# Patient Record
Sex: FEMALE | Race: WHITE
[De-identification: names, ages, dates, MRNs, and addresses within clinical notes are randomized per-mention and may not be internally consistent; named-entity substitution may affect disease eponyms.]

---

## 2020-02-22 ENCOUNTER — HOSPITAL (OUTPATIENT)
Dept: OTHER | Age: 65
End: 2020-02-22

## 2020-02-22 LAB
ANALYZER ANC (IANC): ABNORMAL
ANION GAP SERPL CALC-SCNC: 15 MMOL/L (ref 10–20)
BASOPHILS # BLD: 0 K/MCL (ref 0–0.3)
BASOPHILS NFR BLD: 0 %
BUN SERPL-MCNC: 16 MG/DL (ref 6–20)
BUN/CREAT SERPL: 14 (ref 7–25)
CALCIUM SERPL-MCNC: 10.3 MG/DL (ref 8.4–10.2)
CHLORIDE SERPL-SCNC: 102 MMOL/L (ref 98–107)
CO2 SERPL-SCNC: 27 MMOL/L (ref 21–32)
CREAT SERPL-MCNC: 1.18 MG/DL (ref 0.51–0.95)
DIFFERENTIAL METHOD BLD: ABNORMAL
EOSINOPHIL # BLD: 0.1 K/MCL (ref 0.1–0.5)
EOSINOPHIL NFR BLD: 2 %
ERYTHROCYTE [DISTWIDTH] IN BLOOD: 14.2 % (ref 11–15)
GLUCOSE SERPL-MCNC: 129 MG/DL (ref 65–99)
HCT VFR BLD CALC: 48.5 % (ref 36–46.5)
HGB BLD-MCNC: 15.7 G/DL (ref 12–15.5)
IMM GRANULOCYTES # BLD AUTO: 0 K/MCL (ref 0–0.2)
IMM GRANULOCYTES NFR BLD: 1 %
LYMPHOCYTES # BLD: 2.1 K/MCL (ref 1–4)
LYMPHOCYTES NFR BLD: 30 %
MAGNESIUM SERPL-MCNC: 2.8 MG/DL (ref 1.7–2.4)
MCH RBC QN AUTO: 28.8 PG (ref 26–34)
MCHC RBC AUTO-ENTMCNC: 32.4 G/DL (ref 32–36.5)
MCV RBC AUTO: 89 FL (ref 78–100)
MONOCYTES # BLD: 0.5 K/MCL (ref 0.3–0.9)
MONOCYTES NFR BLD: 7 %
NEUTROPHILS # BLD: 4.3 K/MCL (ref 1.8–7.7)
NEUTROPHILS NFR BLD: 60 %
NEUTS SEG NFR BLD: ABNORMAL %
NRBC (NRBCRE): 0 /100 WBC
PLATELET # BLD: 194 K/MCL (ref 140–450)
POTASSIUM SERPL-SCNC: 3.8 MMOL/L (ref 3.4–5.1)
RBC # BLD: 5.45 MIL/MCL (ref 4–5.2)
SODIUM SERPL-SCNC: 140 MMOL/L (ref 135–145)
TROPONIN I SERPL HS-MCNC: 0.02 NG/ML
WBC # BLD: 7.1 K/MCL (ref 4.2–11)

## 2020-02-22 PROCEDURE — 99285 EMERGENCY DEPT VISIT HI MDM: CPT | Performed by: EMERGENCY MEDICINE

## 2020-02-23 ENCOUNTER — DIAGNOSTIC TRANS (OUTPATIENT)
Dept: OTHER | Age: 65
End: 2020-02-23

## 2020-02-23 LAB
ANALYZER ANC (IANC): ABNORMAL
ANION GAP SERPL CALC-SCNC: 10 MMOL/L (ref 10–20)
BASOPHILS # BLD: 0 K/MCL (ref 0–0.3)
BASOPHILS NFR BLD: 0 %
BUN SERPL-MCNC: 14 MG/DL (ref 6–20)
BUN/CREAT SERPL: 15 (ref 7–25)
CALCIUM SERPL-MCNC: 9 MG/DL (ref 8.4–10.2)
CHLORIDE SERPL-SCNC: 108 MMOL/L (ref 98–107)
CHOLEST SERPL-MCNC: 290 MG/DL
CHOLEST SERPL-MCNC: ABNORMAL MG/DL
CHOLEST/HDLC SERPL: 2.3 {RATIO}
CO2 SERPL-SCNC: 29 MMOL/L (ref 21–32)
CREAT SERPL-MCNC: 0.91 MG/DL (ref 0.51–0.95)
DIFFERENTIAL METHOD BLD: ABNORMAL
EOSINOPHIL # BLD: 0 K/MCL (ref 0.1–0.5)
EOSINOPHIL NFR BLD: 1 %
ERYTHROCYTE [DISTWIDTH] IN BLOOD: 14.1 % (ref 11–15)
GLUCOSE SERPL-MCNC: 88 MG/DL (ref 65–99)
HBA1C MFR BLD: 10.0           24 %
HBA1C MFR BLD: 5.1 % (ref 4.5–5.6)
HBA1C MFR BLD: 6.0            126 %
HBA1C MFR BLD: 6.5            14 %
HBA1C MFR BLD: 7.0            154 %
HBA1C MFR BLD: 7.5            169 %
HBA1C MFR BLD: 8.0            183 %
HBA1C MFR BLD: 8.5            197 %
HBA1C MFR BLD: 9.0            212 %
HBA1C MFR BLD: 9.5            226 %
HBA1C MFR BLD: NORMAL %
HCT VFR BLD CALC: 41.1 % (ref 36–46.5)
HDLC SERPL-MCNC: 127 MG/DL
HDLC SERPL-MCNC: ABNORMAL MG/DL
HGB BLD-MCNC: 13.2 G/DL (ref 12–15.5)
IMM GRANULOCYTES # BLD AUTO: 0 K/MCL (ref 0–0.2)
IMM GRANULOCYTES NFR BLD: 0 %
LDLC SERPL CALC-MCNC: 155 MG/DL
LDLC SERPL CALC-MCNC: ABNORMAL MG/DL
LYMPHOCYTES # BLD: 1.7 K/MCL (ref 1–4)
LYMPHOCYTES NFR BLD: 20 %
MCH RBC QN AUTO: 28.4 PG (ref 26–34)
MCHC RBC AUTO-ENTMCNC: 32.1 G/DL (ref 32–36.5)
MCV RBC AUTO: 88.6 FL (ref 78–100)
MONOCYTES # BLD: 0.4 K/MCL (ref 0.3–0.9)
MONOCYTES NFR BLD: 5 %
NEUTROPHILS # BLD: 6 K/MCL (ref 1.8–7.7)
NEUTROPHILS NFR BLD: 74 %
NEUTS SEG NFR BLD: ABNORMAL %
NONHDLC SERPL-MCNC: 163 MG/DL
NONHDLC SERPL-MCNC: ABNORMAL MG/DL
NRBC (NRBCRE): 0 /100 WBC
PLATELET # BLD: 165 K/MCL (ref 140–450)
POTASSIUM SERPL-SCNC: 4.5 MMOL/L (ref 3.4–5.1)
RBC # BLD: 4.64 MIL/MCL (ref 4–5.2)
SODIUM SERPL-SCNC: 142 MMOL/L (ref 135–145)
T3FREE SERPL-MCNC: 0.6 PG/ML (ref 2.2–4)
T4 FREE SERPL-MCNC: 0.2 NG/DL (ref 0.8–1.5)
T4 FREE SERPL-MCNC: ABNORMAL NG/DL
TRIGL SERPL-MCNC: 39 MG/DL
TRIGL SERPL-MCNC: ABNORMAL MG/DL
TROPONIN I SERPL HS-MCNC: 0.85 NG/ML
TROPONIN I SERPL HS-MCNC: 0.9 NG/ML
TROPONIN I SERPL HS-MCNC: 1.18 NG/ML
TROPONIN I SERPL HS-MCNC: ABNORMAL NG/L
TSH SERPL-ACNC: 432.13 MCUNITS/ML (ref 0.35–5)
TSH SERPL-ACNC: ABNORMAL M[IU]/L
WBC # BLD: 8.2 K/MCL (ref 4.2–11)

## 2020-02-23 PROCEDURE — 99220 INITIAL OBSERVATION CARE,LEVL III: CPT | Performed by: INTERNAL MEDICINE

## 2020-03-06 RX ORDER — LEVOTHYROXINE SODIUM 112 UG/1
112 TABLET ORAL DAILY
Qty: 30 TABLET | Refills: 0 | Status: SHIPPED | OUTPATIENT
Start: 2020-03-06

## 2020-03-11 ENCOUNTER — OFFICE VISIT (OUTPATIENT)
Dept: CARDIOLOGY | Age: 65
End: 2020-03-11

## 2020-03-11 VITALS
DIASTOLIC BLOOD PRESSURE: 124 MMHG | BODY MASS INDEX: 18.66 KG/M2 | RESPIRATION RATE: 12 BRPM | HEART RATE: 72 BPM | WEIGHT: 112 LBS | HEIGHT: 65 IN | SYSTOLIC BLOOD PRESSURE: 192 MMHG

## 2020-03-11 DIAGNOSIS — R00.2 PALPITATIONS: Primary | ICD-10-CM

## 2020-03-11 PROCEDURE — 93000 ELECTROCARDIOGRAM COMPLETE: CPT | Performed by: INTERNAL MEDICINE

## 2020-03-11 PROCEDURE — 99205 OFFICE O/P NEW HI 60 MIN: CPT | Performed by: INTERNAL MEDICINE

## 2020-03-11 RX ORDER — MULTIVIT-MIN/IRON/FOLIC ACID/K 18-600-40
2000 CAPSULE ORAL DAILY
COMMUNITY

## 2020-03-11 SDOH — HEALTH STABILITY: PHYSICAL HEALTH: ON AVERAGE, HOW MANY MINUTES DO YOU ENGAGE IN EXERCISE AT THIS LEVEL?: 60 MIN

## 2020-03-11 SDOH — HEALTH STABILITY: PHYSICAL HEALTH: ON AVERAGE, HOW MANY DAYS PER WEEK DO YOU ENGAGE IN MODERATE TO STRENUOUS EXERCISE (LIKE A BRISK WALK)?: 5 DAYS

## 2020-03-11 ASSESSMENT — PATIENT HEALTH QUESTIONNAIRE - PHQ9
1. LITTLE INTEREST OR PLEASURE IN DOING THINGS: NOT AT ALL
SUM OF ALL RESPONSES TO PHQ9 QUESTIONS 1 AND 2: 0
2. FEELING DOWN, DEPRESSED OR HOPELESS: NOT AT ALL
SUM OF ALL RESPONSES TO PHQ9 QUESTIONS 1 AND 2: 0

## 2020-03-17 ENCOUNTER — TELEPHONE (OUTPATIENT)
Dept: CARDIOLOGY | Age: 65
End: 2020-03-17

## 2020-03-26 ENCOUNTER — APPOINTMENT (OUTPATIENT)
Dept: CARDIOLOGY | Age: 65
End: 2020-03-26
Attending: INTERNAL MEDICINE

## 2022-05-18 ENCOUNTER — APPOINTMENT (OUTPATIENT)
Dept: CT IMAGING | Facility: HOSPITAL | Age: 67
End: 2022-05-18
Attending: EMERGENCY MEDICINE
Payer: COMMERCIAL

## 2022-05-18 ENCOUNTER — HOSPITAL ENCOUNTER (INPATIENT)
Facility: HOSPITAL | Age: 67
LOS: 7 days | Discharge: NURSING, REHAB OR LONG-TERM CARE | End: 2022-05-25
Attending: EMERGENCY MEDICINE | Admitting: HOSPITALIST
Payer: COMMERCIAL

## 2022-05-18 DIAGNOSIS — I16.1 HYPERTENSIVE EMERGENCY: ICD-10-CM

## 2022-05-18 DIAGNOSIS — I61.9 CEREBRAL BRAIN HEMORRHAGE (HCC): Primary | ICD-10-CM

## 2022-05-18 LAB
ALBUMIN SERPL-MCNC: 4.2 G/DL (ref 3.4–5)
ALBUMIN/GLOB SERPL: 1.2 {RATIO} (ref 1–2)
ALP LIVER SERPL-CCNC: 116 U/L
ALT SERPL-CCNC: 24 U/L
ANION GAP SERPL CALC-SCNC: 6 MMOL/L (ref 0–18)
APTT PPP: 24.6 SECONDS (ref 23.3–35.6)
AST SERPL-CCNC: 20 U/L (ref 15–37)
BASOPHILS # BLD AUTO: 0.01 X10(3) UL (ref 0–0.2)
BASOPHILS NFR BLD AUTO: 0.1 %
BILIRUB SERPL-MCNC: 0.3 MG/DL (ref 0.1–2)
BUN BLD-MCNC: 17 MG/DL (ref 7–18)
CALCIUM BLD-MCNC: 10.7 MG/DL (ref 8.5–10.1)
CHLORIDE SERPL-SCNC: 106 MMOL/L (ref 98–112)
CO2 SERPL-SCNC: 27 MMOL/L (ref 21–32)
CREAT BLD-MCNC: 0.77 MG/DL
EOSINOPHIL # BLD AUTO: 0.11 X10(3) UL (ref 0–0.7)
EOSINOPHIL NFR BLD AUTO: 1.4 %
ERYTHROCYTE [DISTWIDTH] IN BLOOD BY AUTOMATED COUNT: 12.9 %
EST. AVERAGE GLUCOSE BLD GHB EST-MCNC: 100 MG/DL (ref 68–126)
GLOBULIN PLAS-MCNC: 3.6 G/DL (ref 2.8–4.4)
GLUCOSE BLD-MCNC: 100 MG/DL (ref 70–99)
GLUCOSE BLD-MCNC: 89 MG/DL (ref 70–99)
HBA1C MFR BLD: 5.1 % (ref ?–5.7)
HCT VFR BLD AUTO: 45.2 %
HGB BLD-MCNC: 14.2 G/DL
IMM GRANULOCYTES # BLD AUTO: 0.02 X10(3) UL (ref 0–1)
IMM GRANULOCYTES NFR BLD: 0.2 %
INR BLD: 0.92 (ref 0.8–1.2)
LYMPHOCYTES # BLD AUTO: 2.57 X10(3) UL (ref 1–4)
LYMPHOCYTES NFR BLD AUTO: 31.7 %
MAGNESIUM SERPL-MCNC: 2.5 MG/DL (ref 1.6–2.6)
MCH RBC QN AUTO: 27.8 PG (ref 26–34)
MCHC RBC AUTO-ENTMCNC: 31.4 G/DL (ref 31–37)
MCV RBC AUTO: 88.6 FL
MONOCYTES # BLD AUTO: 0.78 X10(3) UL (ref 0.1–1)
MONOCYTES NFR BLD AUTO: 9.6 %
NEUTROPHILS # BLD AUTO: 4.61 X10 (3) UL (ref 1.5–7.7)
NEUTROPHILS # BLD AUTO: 4.61 X10(3) UL (ref 1.5–7.7)
NEUTROPHILS NFR BLD AUTO: 57 %
OSMOLALITY SERPL CALC.SUM OF ELEC: 290 MOSM/KG (ref 275–295)
PLATELET # BLD AUTO: 197 10(3)UL (ref 150–450)
POTASSIUM SERPL-SCNC: 4 MMOL/L (ref 3.5–5.1)
PROT SERPL-MCNC: 7.8 G/DL (ref 6.4–8.2)
PROTHROMBIN TIME: 12.3 SECONDS (ref 11.6–14.8)
RBC # BLD AUTO: 5.1 X10(6)UL
SARS-COV-2 RNA RESP QL NAA+PROBE: NOT DETECTED
SODIUM SERPL-SCNC: 139 MMOL/L (ref 136–145)
T4 FREE SERPL-MCNC: 1.1 NG/DL (ref 0.8–1.7)
TSI SER-ACNC: 13.5 MIU/ML (ref 0.36–3.74)
WBC # BLD AUTO: 8.1 X10(3) UL (ref 4–11)

## 2022-05-18 PROCEDURE — 70496 CT ANGIOGRAPHY HEAD: CPT | Performed by: EMERGENCY MEDICINE

## 2022-05-18 PROCEDURE — 70450 CT HEAD/BRAIN W/O DYE: CPT | Performed by: EMERGENCY MEDICINE

## 2022-05-18 PROCEDURE — 70498 CT ANGIOGRAPHY NECK: CPT | Performed by: EMERGENCY MEDICINE

## 2022-05-18 RX ORDER — HYDRALAZINE HYDROCHLORIDE 20 MG/ML
10 INJECTION INTRAMUSCULAR; INTRAVENOUS EVERY 2 HOUR PRN
Status: DISCONTINUED | OUTPATIENT
Start: 2022-05-18 | End: 2022-05-25

## 2022-05-18 RX ORDER — LORAZEPAM 2 MG/ML
1 INJECTION INTRAMUSCULAR
Status: DISCONTINUED | OUTPATIENT
Start: 2022-05-18 | End: 2022-05-25

## 2022-05-18 RX ORDER — ATORVASTATIN CALCIUM 40 MG/1
40 TABLET, FILM COATED ORAL NIGHTLY
Status: DISCONTINUED | OUTPATIENT
Start: 2022-05-18 | End: 2022-05-18

## 2022-05-18 RX ORDER — METOCLOPRAMIDE HYDROCHLORIDE 5 MG/ML
10 INJECTION INTRAMUSCULAR; INTRAVENOUS EVERY 8 HOURS PRN
Status: DISCONTINUED | OUTPATIENT
Start: 2022-05-18 | End: 2022-05-25

## 2022-05-18 RX ORDER — POLYETHYLENE GLYCOL 3350 17 G/17G
17 POWDER, FOR SOLUTION ORAL DAILY PRN
Status: DISCONTINUED | OUTPATIENT
Start: 2022-05-18 | End: 2022-05-25

## 2022-05-18 RX ORDER — SENNOSIDES 8.6 MG
17.2 TABLET ORAL NIGHTLY PRN
Status: DISCONTINUED | OUTPATIENT
Start: 2022-05-18 | End: 2022-05-25

## 2022-05-18 RX ORDER — ACETAMINOPHEN 650 MG/1
650 SUPPOSITORY RECTAL EVERY 4 HOURS PRN
Status: DISCONTINUED | OUTPATIENT
Start: 2022-05-18 | End: 2022-05-25

## 2022-05-18 RX ORDER — SODIUM PHOSPHATE, DIBASIC AND SODIUM PHOSPHATE, MONOBASIC 7; 19 G/133ML; G/133ML
1 ENEMA RECTAL ONCE AS NEEDED
Status: DISCONTINUED | OUTPATIENT
Start: 2022-05-18 | End: 2022-05-25

## 2022-05-18 RX ORDER — SODIUM CHLORIDE 9 MG/ML
INJECTION, SOLUTION INTRAVENOUS CONTINUOUS
Status: DISCONTINUED | OUTPATIENT
Start: 2022-05-18 | End: 2022-05-20

## 2022-05-18 RX ORDER — ONDANSETRON 2 MG/ML
4 INJECTION INTRAMUSCULAR; INTRAVENOUS EVERY 6 HOURS PRN
Status: DISCONTINUED | OUTPATIENT
Start: 2022-05-18 | End: 2022-05-25

## 2022-05-18 RX ORDER — LABETALOL HYDROCHLORIDE 5 MG/ML
10 INJECTION, SOLUTION INTRAVENOUS EVERY 10 MIN PRN
Status: COMPLETED | OUTPATIENT
Start: 2022-05-18 | End: 2022-05-23

## 2022-05-18 RX ORDER — LEVOTHYROXINE SODIUM 112 UG/1
112 TABLET ORAL DAILY
COMMUNITY
Start: 2022-05-08

## 2022-05-18 RX ORDER — LABETALOL HYDROCHLORIDE 5 MG/ML
20 INJECTION, SOLUTION INTRAVENOUS ONCE
Status: COMPLETED | OUTPATIENT
Start: 2022-05-18 | End: 2022-05-18

## 2022-05-18 RX ORDER — ACETAMINOPHEN 325 MG/1
650 TABLET ORAL EVERY 4 HOURS PRN
Status: DISCONTINUED | OUTPATIENT
Start: 2022-05-18 | End: 2022-05-25

## 2022-05-18 RX ORDER — BISACODYL 10 MG
10 SUPPOSITORY, RECTAL RECTAL
Status: DISCONTINUED | OUTPATIENT
Start: 2022-05-18 | End: 2022-05-25

## 2022-05-19 ENCOUNTER — APPOINTMENT (OUTPATIENT)
Dept: CT IMAGING | Facility: HOSPITAL | Age: 67
End: 2022-05-19
Attending: EMERGENCY MEDICINE
Payer: COMMERCIAL

## 2022-05-19 ENCOUNTER — APPOINTMENT (OUTPATIENT)
Dept: CV DIAGNOSTICS | Facility: HOSPITAL | Age: 67
End: 2022-05-19
Attending: EMERGENCY MEDICINE
Payer: COMMERCIAL

## 2022-05-19 LAB
ANION GAP SERPL CALC-SCNC: 1 MMOL/L (ref 0–18)
BUN BLD-MCNC: 10 MG/DL (ref 7–18)
CALCIUM BLD-MCNC: 9.5 MG/DL (ref 8.5–10.1)
CHLORIDE SERPL-SCNC: 111 MMOL/L (ref 98–112)
CHOLEST SERPL-MCNC: 193 MG/DL (ref ?–200)
CO2 SERPL-SCNC: 31 MMOL/L (ref 21–32)
CREAT BLD-MCNC: 0.58 MG/DL
ERYTHROCYTE [DISTWIDTH] IN BLOOD BY AUTOMATED COUNT: 12.8 %
GLUCOSE BLD-MCNC: 103 MG/DL (ref 70–99)
GLUCOSE BLD-MCNC: 90 MG/DL (ref 70–99)
GLUCOSE BLD-MCNC: 93 MG/DL (ref 70–99)
GLUCOSE BLD-MCNC: 94 MG/DL (ref 70–99)
HCT VFR BLD AUTO: 42 %
HDLC SERPL-MCNC: 97 MG/DL (ref 40–59)
HGB BLD-MCNC: 13.6 G/DL
LDLC SERPL CALC-MCNC: 85 MG/DL (ref ?–100)
MAGNESIUM SERPL-MCNC: 2.1 MG/DL (ref 1.6–2.6)
MCH RBC QN AUTO: 28.9 PG (ref 26–34)
MCHC RBC AUTO-ENTMCNC: 32.4 G/DL (ref 31–37)
MCV RBC AUTO: 89.2 FL
NONHDLC SERPL-MCNC: 96 MG/DL (ref ?–130)
OSMOLALITY SERPL CALC.SUM OF ELEC: 295 MOSM/KG (ref 275–295)
PLATELET # BLD AUTO: 170 10(3)UL (ref 150–450)
POTASSIUM SERPL-SCNC: 3.6 MMOL/L (ref 3.5–5.1)
RBC # BLD AUTO: 4.71 X10(6)UL
SODIUM SERPL-SCNC: 143 MMOL/L (ref 136–145)
TRIGL SERPL-MCNC: 59 MG/DL (ref 30–149)
VLDLC SERPL CALC-MCNC: 9 MG/DL (ref 0–30)
WBC # BLD AUTO: 8.1 X10(3) UL (ref 4–11)

## 2022-05-19 PROCEDURE — 99291 CRITICAL CARE FIRST HOUR: CPT | Performed by: INTERNAL MEDICINE

## 2022-05-19 PROCEDURE — 93306 TTE W/DOPPLER COMPLETE: CPT | Performed by: NURSE PRACTITIONER

## 2022-05-19 PROCEDURE — 70450 CT HEAD/BRAIN W/O DYE: CPT | Performed by: NURSE PRACTITIONER

## 2022-05-19 PROCEDURE — 99291 CRITICAL CARE FIRST HOUR: CPT | Performed by: NURSE PRACTITIONER

## 2022-05-19 RX ORDER — LISINOPRIL 10 MG/1
10 TABLET ORAL DAILY
Status: DISCONTINUED | OUTPATIENT
Start: 2022-05-19 | End: 2022-05-19

## 2022-05-19 RX ORDER — LOSARTAN POTASSIUM 25 MG/1
25 TABLET ORAL DAILY
Status: DISCONTINUED | OUTPATIENT
Start: 2022-05-19 | End: 2022-05-20

## 2022-05-19 RX ORDER — MULTIPLE VITAMINS W/ MINERALS TAB 9MG-400MCG
1 TAB ORAL DAILY
Status: DISCONTINUED | OUTPATIENT
Start: 2022-05-20 | End: 2022-05-25

## 2022-05-19 RX ORDER — LEVOTHYROXINE SODIUM 112 UG/1
112 TABLET ORAL DAILY
Status: DISCONTINUED | OUTPATIENT
Start: 2022-05-19 | End: 2022-05-25

## 2022-05-19 RX ORDER — MUSCLE RUB CREAM 100; 150 MG/G; MG/G
CREAM TOPICAL 3 TIMES DAILY PRN
Status: DISCONTINUED | OUTPATIENT
Start: 2022-05-19 | End: 2022-05-25

## 2022-05-19 RX ORDER — TRAMADOL HYDROCHLORIDE 50 MG/1
50 TABLET ORAL EVERY 6 HOURS PRN
Status: DISCONTINUED | OUTPATIENT
Start: 2022-05-19 | End: 2022-05-25

## 2022-05-19 RX ORDER — ACETAMINOPHEN 10 MG/ML
1000 INJECTION, SOLUTION INTRAVENOUS ONCE
Status: COMPLETED | OUTPATIENT
Start: 2022-05-19 | End: 2022-05-19

## 2022-05-19 NOTE — PHYSICAL THERAPY NOTE
PHYSICAL THERAPY EVALUATION - INPATIENT     Room Number: 5702/3040-N  Evaluation Date: 5/19/2022  Type of Evaluation: Initial  Physician Order: PT Eval and Treat    Presenting Problem: R thalamic hematoma  Co-Morbidities : anxiety, BRCA, HTN, hypothyroid  Reason for Therapy: Mobility Dysfunction and Discharge Planning    History related to current admission: Patient is a 74year old female admitted on 5/18/2022 from home for fall x 2, HA, L face numbness, LLE weakness. Pt diagnosed with R thalamic hematoma. ASSESSMENT   In this PT evaluation, the patient presents with the following impairments motor planning deficits, sitting/standing balance deficits, LUE/LLE weakness, decreased proprioception. These impairments and comorbidities manifest themselves as functional limitations in independent bed mobility, transfers, and gait. Pt able to progress to sitting/standing balance and >household distance ambulation with assist.  Pt requiring vcs for use of LUE/LLE. The patient is below baseline and would benefit from skilled inpatient PT to address the above deficits to assist patient in returning to prior to level of function. Functional outcome measures completed include [facility]. The -PAC '6-Clicks' Inpatient Basic Mobility Short Form was completed and this patient is demonstrating a Approx Degree of Impairment: 46.58%  degree of impairment in mobility. Research supports that patients with this level of impairment may benefit from acute rehab to achieve mod I levels, facilitators include age, family support and functional strength, barriers include sensory deficits. Based on this evaluation, patient's clinical presentation is evolving and overall the evaluation complexity is considered moderate. DISCHARGE RECOMMENDATIONS  PT Discharge Recommendations: Acute rehabilitation    PLAN  PT Treatment Plan: Bed mobility; Patient education; Family education;Gait training;Neuromuscular re-educate;Stair training;Transfer training;Balance training  Rehab Potential : Good  Frequency (Obs): 3-5x/week  Number of Visits to Meet Established Goals: 5      CURRENT GOALS    Goal #1 Patient is able to demonstrate supine - sit EOB @ level: modified independent     Goal #2 Patient is able to demonstrate transfers EOB to/from Chair/Wheelchair at assistance level: supervision     Goal #3 Patient is able to ambulate 200 feet with assist device: cane - straight at assistance level: supervision     Goal #4 Pt to sit indeply x 5 mins   Goal #5    Goal #6    Goal Comments: Goals established on 2022    HOME SITUATION  Type of Home: House   Home Layout:  (basement stairs to shower and laundry)                Lives With: Spouse (32 yo daughter and her boyfriend)  Drives: Yes  Patient Owned Equipment: None  Patient Regularly Uses: Glasses    Prior Level of [place]: works as a banker, prior to these symptoms, pt fully indep in [place] feeling cold causing her to jitter and sway, c/o numbness at L finger tips and difficulty eating      OBJECTIVE  Precautions:  (-160)  Fall Risk: High fall risk    WEIGHT BEARING RESTRICTION  Weight Bearing Restriction: None                PAIN ASSESSMENT  Ratin  Location: HA  Management Techniques:  Activity promotion    COGNITION  A&Ox4, follows commands, aware of deficits    RANGE OF MOTION AND STRENGTH ASSESSMENT  Upper extremity ROM and strength are within functional limits : decreased fluidity of LUE, increase use of RUE, *L handed    Lower extremity ROM is within functional limits     Lower extremity strength is within functional limits 4/5 LLE      BALANCE  Static Sitting: Fair  Dynamic Sitting: Fair -  Static Standing: Fair -  Dynamic Standing: Poor +    ADDITIONAL TESTS                                    ACTIVITY TOLERANCE  Pulse: 84        BP: 153/80        Patient Position: Sitting    O2 WALK  Oxygen Therapy  SPO2% Ambulation on Room Air: 100    NEUROLOGICAL FINDINGS      Intact proprio RUE/RLE   Decreased proprioception LUE 3rd DIP, LLE great toe  SILT BUE/BLE    KOLBY B ankles intact    Vision: functionally: able to identify objects R/L sides  Decreased attention to LUE               AM-PAC '6-Clicks' INPATIENT SHORT FORM - BASIC MOBILITY  How much difficulty does the patient currently have. .. Patient Difficulty: Turning over in bed (including adjusting bedclothes, sheets and blankets)?: None   Patient Difficulty: Sitting down on and standing up from a chair with arms (e.g., wheelchair, bedside commode, etc.): A Little   Patient Difficulty: Moving from lying on back to sitting on the side of the bed?: A Little   How much help from another person does the patient currently need. ..    Help from Another: Moving to and from a bed to a chair (including a wheelchair)?: A Little   Help from Another: Need to walk in hospital room?: A Little   Help from Another: Climbing 3-5 steps with a railing?: A Lot       AM-PAC Score:  Raw Score: 18   Approx Degree of Impairment: 46.58%   Standardized Score (AM-PAC Scale): 43.63   CMS Modifier (G-Code): CK    FUNCTIONAL ABILITY STATUS  Gait Assessment   Functional Mobility/Gait Assessment  Gait Assistance: Minimum assistance  Distance (ft): 200  Assistive Device:  (L hand held assist)    Skilled Therapy Provided     Bed Mobility:  Supine to long sit: supervision  Long sit>sitting to EOB min A at L side  Sitting balance with increased anterior/posterior sway, forward head occasional min A, otherwise CGA/close supervision  Sit<>Stand min A  Standing balance with single LUE support CGA, dynamic activities with min A and HHA, mod A for trunk turns and without UE support  Amb 10ft with RW: decreased speed min A for grasp on LUE and steering L side  Amb 200ft with LUE HHA min A, increased speed, occasional scissoring of LLE, narrow JUDAH, decreased speed of LLE; able to correct with constant vcs    Education: exam results, role of PT/OT, dc recs, safety, POC, goals, pt/pt's  in agreement    Patient End of Session: In bed;Needs met;Call light within reach;RN aware of session/findings; All patient questions and concerns addressed; Family present

## 2022-05-19 NOTE — PLAN OF CARE
Received report from [place]. Patient arrived on floor alert and  oriented x 3. No signs of distress noted. Family members at bedside. Encourage patient to use call light for assistance. Bed low and locked. Side rails padded. Wctm and notify service of any acute changes. Problem: Patient/Family Goals  Goal: Patient/Family Long Term Goal  Description: Patient's Long Term Goal: discharge home    Interventions:  - PT/OT, speech eval, monitor neuro status, follow up CT scans as needed  - See additional Care Plan goals for specific interventions  Outcome: Progressing  Goal: Patient/Family Short Term Goal  Description: Patient's Short Term Goal: maintain SBP within desired parameters    Interventions:   - monitor vitals, medications as indicated  - See additional Care Plan goals for specific interventions  Outcome: Progressing     Problem: NEUROLOGICAL - ADULT  Goal: Achieves stable or improved neurological status  Description: INTERVENTIONS  - Assess for and report changes in neurological status  - Initiate measures to prevent increased intracranial pressure  - Maintain blood pressure and fluid volume within ordered parameters to optimize cerebral perfusion and minimize risk of hemorrhage  - Monitor temperature, glucose, and sodium.  Initiate appropriate interventions as ordered  Outcome: Progressing  Goal: Absence of seizures  Description: INTERVENTIONS  - Monitor for seizure activity  - Administer anti-seizure medications as ordered  - Monitor neurological status  Outcome: Progressing  Goal: Achieves maximal functionality and self care  Description: INTERVENTIONS  - Monitor swallowing and airway patency with patient fatigue and changes in neurological status  - Encourage and assist patient to increase activity and self care with guidance from PT/OT  - Encourage visually impaired, hearing impaired and aphasic patients to use assistive/communication devices  Outcome: Progressing     Problem: Impaired Functional Mobility  Goal: Achieve highest/safest level of mobility/gait  Description: Interventions:  - Assess patient's functional ability and stability  - Promote increasing activity/tolerance for mobility and gait  - Educate and engage patient/family in tolerated activity level and precautions    Outcome: Progressing     Problem: Impaired Activities of Daily Living  Goal: Achieve highest/safest level of independence in self care  Description: Interventions:  - Assess ability and encourage patient to participate in ADLs to maximize function  - Promote sitting position while performing ADLs such as feeding, grooming, and bathing  - Educate and encourage patient/family in tolerated functional activity level and precautions during self-care    Outcome: Progressing     Problem: Impaired Swallowing  Goal: Minimize aspiration risk  Description: Interventions:  - Patient should be alert and upright for all feedings (90 degrees preferred)  - Offer food and liquids at a slow rate  - No straws  - Encourage small bites of food and small sips of liquid  - Offer pills one at a time, crush or deliver with applesauce as needed  - Discontinue feeding and notify MD (or speech pathologist) if coughing or persistent throat clearing or wet/gurgly vocal quality is noted  Outcome: Progressing

## 2022-05-19 NOTE — CM/SW NOTE
SW notified of PT rec for AR, PMR eval pending. SW to meet with pt to discuss dc planning and recs.     [place] 106, LSW

## 2022-05-19 NOTE — PLAN OF CARE
Received pt. From ER at 2200. Hourly neuro exams as charted in epic. Left sided facial droop, arm drift, decreased sensation to arm, leg and cheek. Seizure precautions initiated. Pt. has frequent painful cramping in legs and states it happens almost nightly but is currently not taking medication for it. Contacted by Dr. Nancy Kelly that repeat CT done at 0200 is stable. Alok ROMANO notified of results. [facility] hospitalist paged regarding admission, voice mail left, no call back. 250cc 0.9 bolus given for SBP <120, BP parameters were then changed to 100-160. NPO until speech evaluation done.

## 2022-05-19 NOTE — ED INITIAL ASSESSMENT (HPI)
Approximately 30 minutes ago she had a really bad headache on the front of her head and then nose and lips numb. Pt went to get up an fell. NO LOC/OR HITTING HER HEAD.  ASHWINI [id number]

## 2022-05-19 NOTE — PROGRESS NOTES
Patient received A&Ox4 but exhibiting left sided facial droop, pronator drift, muscular weakness and sensory deficits. Therapy came to visit patient this AM; patient cleared to have regular diet with thin liquids but recommendations made by OT/PT for acute rehab following discharge d/t functional deficits noted when ambulating in hallway. Providers visited patient; CTU orders placed and blood pressure medications adjusted. Daily cozaar added and PRN Hydralazine given this AM d/t SBP elevated above parameters; stabalized prior to transfer to CTU. Report called to accepting RN. Family aware of transfer.

## 2022-05-19 NOTE — PLAN OF CARE
Code stroke:    Called to [facility] ED C2  paged at 1958 for Code Stroke. Arrived to pt cart side at 2003 in the hallway. Accompanied pt to CT scan and scanning began at 2006    74 yr old female from home with PMHx of HTN, reportedly not taking any meds due to normalized BP and reported cough R/T to med. Pt presented with c/o frontal headache, numbness of mouth and nose, and slow fall to the floor as described as her foot gave out. Upon arrival found patient with NIH of 2 for L facial droop and L arm drift. She denied headache, numbness, tingling or decreased sensation at that time. pt hypertensive in 200's    Last Known Normal at 1915    Patient  doeshave contraindications for TPA-  Imaging findings- bleed    2009: Discussed case with Dr. Rafal Rowe,     neurologist on call. Patient does not meet criteria for neuro intervention based on factors listed above. Repeat NIH= 4 at 2100  For L facial droop, L arm drift. L  side decreased sensation,  L leg ataxia    Medicated for HTN and Cardene gtt titrated  When BP cuff changed ( small) BP readings improved significantly    Discussed case with Dr. Mary Lou Martin  bedside RN, patient, and family. All agree with current POC. Discussed NPO status with pt and family until SLP dima    Accompanied patient to CNICU.

## 2022-05-20 LAB
ANION GAP SERPL CALC-SCNC: 6 MMOL/L (ref 0–18)
BILIRUB UR QL STRIP.AUTO: NEGATIVE
BUN BLD-MCNC: 13 MG/DL (ref 7–18)
CALCIUM BLD-MCNC: 10.7 MG/DL (ref 8.5–10.1)
CHLORIDE SERPL-SCNC: 109 MMOL/L (ref 98–112)
CO2 SERPL-SCNC: 24 MMOL/L (ref 21–32)
COLOR UR AUTO: YELLOW
CREAT BLD-MCNC: 0.9 MG/DL
GLUCOSE BLD-MCNC: 110 MG/DL (ref 70–99)
GLUCOSE BLD-MCNC: 116 MG/DL (ref 70–99)
GLUCOSE BLD-MCNC: 98 MG/DL (ref 70–99)
GLUCOSE BLD-MCNC: 98 MG/DL (ref 70–99)
GLUCOSE UR STRIP.AUTO-MCNC: NEGATIVE MG/DL
KETONES UR STRIP.AUTO-MCNC: NEGATIVE MG/DL
LEUKOCYTE ESTERASE UR QL STRIP.AUTO: NEGATIVE
MAGNESIUM SERPL-MCNC: 2.4 MG/DL (ref 1.6–2.6)
NITRITE UR QL STRIP.AUTO: NEGATIVE
OSMOLALITY SERPL CALC.SUM OF ELEC: 288 MOSM/KG (ref 275–295)
PH UR STRIP.AUTO: 6 [PH] (ref 5–8)
POTASSIUM SERPL-SCNC: 4 MMOL/L (ref 3.5–5.1)
POTASSIUM SERPL-SCNC: 4 MMOL/L (ref 3.5–5.1)
PROT UR STRIP.AUTO-MCNC: NEGATIVE MG/DL
RBC UR QL AUTO: NEGATIVE
SODIUM SERPL-SCNC: 139 MMOL/L (ref 136–145)
SP GR UR STRIP.AUTO: 1.02 (ref 1–1.03)
UROBILINOGEN UR STRIP.AUTO-MCNC: <2 MG/DL

## 2022-05-20 PROCEDURE — 99233 SBSQ HOSP IP/OBS HIGH 50: CPT | Performed by: OTHER

## 2022-05-20 RX ORDER — LOSARTAN POTASSIUM 25 MG/1
25 TABLET ORAL
Status: DISCONTINUED | OUTPATIENT
Start: 2022-05-20 | End: 2022-05-21

## 2022-05-20 NOTE — CM/SW NOTE
SW met with pt to discuss dc recs. Discussed AR rec and PMR pending. Pt surprised of need for inpt rehab. Pt worried about work and if [id number] will be allowed. SW encouraged pt to call work to see what documentation is needed in order to go on leave. Pt reports she has filed once to assist with care taking for her son, who has cancer and her 5year old grandson. States she had to continue working as they would have had a leave without pay. Pt aware SW available to assist. SW left AR choice list with pt, SW to follow up with pt on choice later.      [place] 106, LSW

## 2022-05-20 NOTE — PLAN OF CARE
Assumed care for patient at 0730  Patient observed resting in bed. No signs of distress noted. Alert and oriented x 4. Up with max assist x 2 to [facility]. Medication given per [facility]. Patient is receiving tramadol for pain. Encourage patient to call for assistance. Bed low and locked. Frequently rounding performed. Wctm and notify service of any acute changes. Problem: Patient/Family Goals  Goal: Patient/Family Long Term Goal  Description: Patient's Long Term Goal: discharge home    Interventions:  - PT/OT, speech eval, monitor neuro status, follow up CT scans as needed  - See additional Care Plan goals for specific interventions  Outcome: Progressing  Goal: Patient/Family Short Term Goal  Description: Patient's Short Term Goal: maintain SBP within desired parameters    Interventions:   - monitor vitals, medications as indicated  - See additional Care Plan goals for specific interventions  Outcome: Progressing     Problem: NEUROLOGICAL - ADULT  Goal: Achieves stable or improved neurological status  Description: INTERVENTIONS  - Assess for and report changes in neurological status  - Initiate measures to prevent increased intracranial pressure  - Maintain blood pressure and fluid volume within ordered parameters to optimize cerebral perfusion and minimize risk of hemorrhage  - Monitor temperature, glucose, and sodium.  Initiate appropriate interventions as ordered  Outcome: Progressing  Goal: Absence of seizures  Description: INTERVENTIONS  - Monitor for seizure activity  - Administer anti-seizure medications as ordered  - Monitor neurological status  Outcome: Progressing  Goal: Achieves maximal functionality and self care  Description: INTERVENTIONS  - Monitor swallowing and airway patency with patient fatigue and changes in neurological status  - Encourage and assist patient to increase activity and self care with guidance from PT/OT  - Encourage visually impaired, hearing impaired and aphasic patients to use assistive/communication devices  Outcome: Progressing     Problem: Impaired Functional Mobility  Goal: Achieve highest/safest level of mobility/gait  Description: Interventions:  - Assess patient's functional ability and stability  - Promote increasing activity/tolerance for mobility and gait  - Educate and engage patient/family in tolerated activity level and precautions  {Additional Mobility   Outcome: Progressing     Problem: Impaired Activities of Daily Living  Goal: Achieve highest/safest level of independence in self care  Description: Interventions:  - Assess ability and encourage patient to participate in ADLs to maximize function  - Promote sitting position while performing ADLs such as feeding, grooming, and bathing  - Educate and encourage patient/family in tolerated functional activity level and precautions during self-care    Outcome: Progressing     Problem: Impaired Swallowing  Goal: Minimize aspiration risk  Description: Interventions:  - Patient should be alert and upright for all feedings (90 degrees preferred)  - Offer food and liquids at a slow rate  - No straws  - Encourage small bites of food and small sips of liquid  - Offer pills one at a time, crush or deliver with applesauce as needed  - Discontinue feeding and notify MD (or speech pathologist) if coughing or persistent throat clearing or wet/gurgly vocal quality is noted  Outcome: Progressing

## 2022-05-20 NOTE — PLAN OF CARE
Received patient awake in bed in dark room, patient is having severe headache not relieved with Tylenol. Called MD tonight and got order for Tramadol and she had relief from that, no nausea/vomiting noted. On Stroke protocol, has left arm weakness, slightly. On room air, clear lungs. NSR on tele. To keep -160. Voids per [facility]. Accuchecks q 6 hrs per stroke protocol. Problem: Patient/Family Goals  Goal: Patient/Family Long Term Goal  Description: Patient's Long Term Goal: discharge home    Interventions:  - PT/OT, speech eval, monitor neuro status, follow up CT scans as needed  - See additional Care Plan goals for specific interventions  Outcome: Progressing  Goal: Patient/Family Short Term Goal  Description: Patient's Short Term Goal: maintain SBP within desired parameters    Interventions:   - monitor vitals, medications as indicated  - See additional Care Plan goals for specific interventions  Outcome: Progressing     Problem: NEUROLOGICAL - ADULT  Goal: Achieves stable or improved neurological status  Description: INTERVENTIONS  - Assess for and report changes in neurological status  - Initiate measures to prevent increased intracranial pressure  - Maintain blood pressure and fluid volume within ordered parameters to optimize cerebral perfusion and minimize risk of hemorrhage  - Monitor temperature, glucose, and sodium.  Initiate appropriate interventions as ordered  Outcome: Progressing  Goal: Absence of seizures  Description: INTERVENTIONS  - Monitor for seizure activity  - Administer anti-seizure medications as ordered  - Monitor neurological status  Outcome: Progressing  Goal: Achieves maximal functionality and self care  Description: INTERVENTIONS  - Monitor swallowing and airway patency with patient fatigue and changes in neurological status  - Encourage and assist patient to increase activity and self care with guidance from PT/OT  - Encourage visually impaired, hearing impaired and aphasic patients to use assistive/communication devices  Outcome: Progressing     Problem: Impaired Functional Mobility  Goal: Achieve highest/safest level of mobility/gait  Description: Interventions:  - Assess patient's functional ability and stability  - Promote increasing activity/tolerance for mobility and gait  - Educate and engage patient/family in tolerated activity level and precautions    Outcome: Progressing     Problem: Impaired Activities of Daily Living  Goal: Achieve highest/safest level of independence in self care  Description: Interventions:  - Assess ability and encourage patient to participate in ADLs to maximize function  - Promote sitting position while performing ADLs such as feeding, grooming, and bathing  - Educate and encourage patient/family in tolerated functional activity level and precautions during self-care    Outcome: Progressing     Problem: Impaired Swallowing  Goal: Minimize aspiration risk  Description: Interventions:  - Patient should be alert and upright for all feedings (90 degrees preferred)  - Offer food and liquids at a slow rate  - No straws  - Encourage small bites of food and small sips of liquid  - Offer pills one at a time, crush or deliver with applesauce as needed  - Discontinue feeding and notify MD (or speech pathologist) if coughing or persistent throat clearing or wet/gurgly vocal quality is noted  Outcome: Progressing

## 2022-05-21 LAB
ANION GAP SERPL CALC-SCNC: 6 MMOL/L (ref 0–18)
BUN BLD-MCNC: 16 MG/DL (ref 7–18)
CALCIUM BLD-MCNC: 10.3 MG/DL (ref 8.5–10.1)
CHLORIDE SERPL-SCNC: 108 MMOL/L (ref 98–112)
CO2 SERPL-SCNC: 27 MMOL/L (ref 21–32)
CREAT BLD-MCNC: 0.8 MG/DL
GLUCOSE BLD-MCNC: 101 MG/DL (ref 70–99)
GLUCOSE BLD-MCNC: 102 MG/DL (ref 70–99)
GLUCOSE BLD-MCNC: 104 MG/DL (ref 70–99)
GLUCOSE BLD-MCNC: 109 MG/DL (ref 70–99)
GLUCOSE BLD-MCNC: 117 MG/DL (ref 70–99)
GLUCOSE BLD-MCNC: 96 MG/DL (ref 70–99)
MAGNESIUM SERPL-MCNC: 2.3 MG/DL (ref 1.6–2.6)
OSMOLALITY SERPL CALC.SUM OF ELEC: 293 MOSM/KG (ref 275–295)
POTASSIUM SERPL-SCNC: 3.9 MMOL/L (ref 3.5–5.1)
SODIUM SERPL-SCNC: 141 MMOL/L (ref 136–145)

## 2022-05-21 PROCEDURE — 99233 SBSQ HOSP IP/OBS HIGH 50: CPT | Performed by: OTHER

## 2022-05-21 RX ORDER — LOSARTAN POTASSIUM 50 MG/1
50 TABLET ORAL
Status: DISCONTINUED | OUTPATIENT
Start: 2022-05-21 | End: 2022-05-23

## 2022-05-21 RX ORDER — LOSARTAN POTASSIUM 50 MG/1
50 TABLET ORAL ONCE
Status: COMPLETED | OUTPATIENT
Start: 2022-05-21 | End: 2022-05-21

## 2022-05-21 NOTE — CM/SW NOTE
Patient is willing to dc to . MSW reserved in [address]. Anticipated dc tomorrow.     Kandace Cole, LCSW

## 2022-05-21 NOTE — PLAN OF CARE
PT A/O, LT FACIAL DROOP, LT ARM/LEG WEAKER, 97% ON RA, SR, VOIDING IN BEDPAN, C/O OF HEADACHE, GIVEN TRAMADOL AND TYLENOL, RESTING QUIETLY DURING NIGHT, LABS IN AM    Problem: NEUROLOGICAL - ADULT  Goal: Achieves stable or improved neurological status  Description: INTERVENTIONS  - Assess for and report changes in neurological status  - Initiate measures to prevent increased intracranial pressure  - Maintain blood pressure and fluid volume within ordered parameters to optimize cerebral perfusion and minimize risk of hemorrhage  - Monitor temperature, glucose, and sodium.  Initiate appropriate interventions as ordered  Outcome: Progressing

## 2022-05-22 LAB
GLUCOSE BLD-MCNC: 101 MG/DL (ref 70–99)
GLUCOSE BLD-MCNC: 114 MG/DL (ref 70–99)

## 2022-05-22 RX ORDER — HYDROCORTISONE 25 MG/G
CREAM TOPICAL 2 TIMES DAILY
Status: DISCONTINUED | OUTPATIENT
Start: 2022-05-22 | End: 2022-05-25

## 2022-05-22 NOTE — CM/SW NOTE
SW spoke with RN regarding [place] and discharge. Spoke with Selena Mckeon, they need insurance authorization and it has not been approved yet.      DINAH Spence, [facility]   / Discharge Planner  [id number]

## 2022-05-22 NOTE — PLAN OF CARE
Assumed care at [address].    A&Ox4, RA, NSR on tele  Q4 Neuro, new deficits   Up in bathroom x1-2 assist    Denying pain   Elevated bp, prn labetalol given   Call light within reach    Patient updated on plan of care

## 2022-05-23 LAB
GLUCOSE BLD-MCNC: 96 MG/DL (ref 70–99)
GLUCOSE BLD-MCNC: 96 MG/DL (ref 70–99)

## 2022-05-23 RX ORDER — CARVEDILOL 6.25 MG/1
6.25 TABLET ORAL 2 TIMES DAILY WITH MEALS
Status: DISCONTINUED | OUTPATIENT
Start: 2022-05-23 | End: 2022-05-24

## 2022-05-23 RX ORDER — LOSARTAN POTASSIUM 50 MG/1
50 TABLET ORAL ONCE
Status: COMPLETED | OUTPATIENT
Start: 2022-05-23 | End: 2022-05-23

## 2022-05-23 RX ORDER — LOSARTAN POTASSIUM 100 MG/1
100 TABLET ORAL
Status: DISCONTINUED | OUTPATIENT
Start: 2022-05-23 | End: 2022-05-25

## 2022-05-23 NOTE — PLAN OF CARE
Pt in better spirits today. Up with assist. Balance remains fair. Weak left leg and arm. Discharge plan is [facility]. SW to follow tomorrow. BP better today. Headache less today.

## 2022-05-23 NOTE — PLAN OF CARE
Received pt care at 1930  Pt a&ox4  No new neuro changes  C/O 4/10 headache, received tramadol 1x w/ relief  BP within parameters  Able to get up to bathroom   Awaiting insurance auth for [place]

## 2022-05-23 NOTE — PLAN OF CARE
Awake, alert & oriented x4  No new neurological changes  C/o headache pain; some relief with PRN tylenol  SBP 200s after working with PT; MD notified. PRN labetolol given  BP medications adjusted per Hospitalist  Adequate PO intake  Daughter at bedside  Discharge planning in progress; plan to discharge to [facility] pending insurance auth and BP control    Plan of care discussed with patient, daughter, and physician.

## 2022-05-24 ENCOUNTER — APPOINTMENT (OUTPATIENT)
Dept: CT IMAGING | Facility: HOSPITAL | Age: 67
End: 2022-05-24
Attending: INTERNAL MEDICINE
Payer: COMMERCIAL

## 2022-05-24 LAB — GLUCOSE BLD-MCNC: 95 MG/DL (ref 70–99)

## 2022-05-24 PROCEDURE — 70450 CT HEAD/BRAIN W/O DYE: CPT | Performed by: INTERNAL MEDICINE

## 2022-05-24 RX ORDER — CARVEDILOL 6.25 MG/1
6.25 TABLET ORAL ONCE
Status: COMPLETED | OUTPATIENT
Start: 2022-05-24 | End: 2022-05-24

## 2022-05-24 RX ORDER — TRAMADOL HYDROCHLORIDE 50 MG/1
50 TABLET ORAL EVERY 6 HOURS PRN
Qty: 30 TABLET | Refills: 0 | Status: SHIPPED | OUTPATIENT
Start: 2022-05-24

## 2022-05-24 RX ORDER — CARVEDILOL 12.5 MG/1
12.5 TABLET ORAL 2 TIMES DAILY WITH MEALS
Qty: 60 TABLET | Refills: 0 | Status: SHIPPED | OUTPATIENT
Start: 2022-05-24

## 2022-05-24 RX ORDER — CARVEDILOL 12.5 MG/1
12.5 TABLET ORAL 2 TIMES DAILY WITH MEALS
Status: DISCONTINUED | OUTPATIENT
Start: 2022-05-24 | End: 2022-05-25

## 2022-05-24 RX ORDER — LOSARTAN POTASSIUM 100 MG/1
100 TABLET ORAL 2 TIMES DAILY
Qty: 60 TABLET | Refills: 0 | Status: SHIPPED | OUTPATIENT
Start: 2022-05-24

## 2022-05-24 NOTE — PLAN OF CARE
Assumed care at 0730  Orientated x4, NSR, RA  Complaints of H/A, tearful at times due to pain, \"pain has not gotten better\"; given Tylenol PRN     Nq4; No new neuro deficits, L side weaker than R-- small change   Per patient \"decreased left sided dexterity\"; drops food with left hand when eating, hard to text with left hand  Patient feels uneasy; CT of head ordered     BP monitored and controlled; cardiac meds increased per card--see MAR     Up x1 standby; voids by bathroom   Complaints of constipation--refused laxative; \"will try suppository at night\"   Encouraged liquids and ambulation     Plan for discharge to [address] [place] [address]; pending insurance, review of CT head before discharge

## 2022-05-24 NOTE — PLAN OF CARE
Assumed care at [address].    A&Ox4, RA, NSR on tele  Q4 Neuro, no new deficits   Up in bathroom x1 assist and walker with adequate urine output   Elevated bp scheduled cozaar given with adequate relief   Prn tramadol given for HA with relief   Call light within reach    Patient updated on plan of care

## 2022-05-25 VITALS
HEIGHT: 64 IN | RESPIRATION RATE: 20 BRPM | SYSTOLIC BLOOD PRESSURE: 154 MMHG | TEMPERATURE: 98 F | WEIGHT: 98.63 LBS | DIASTOLIC BLOOD PRESSURE: 96 MMHG | HEART RATE: 62 BPM | BODY MASS INDEX: 16.84 KG/M2 | OXYGEN SATURATION: 98 %

## 2022-05-25 LAB
GLUCOSE BLD-MCNC: 98 MG/DL (ref 70–99)
SARS-COV-2 RNA RESP QL NAA+PROBE: NOT DETECTED

## 2022-05-25 NOTE — CM/SW NOTE
Received insurance auth for [employer]. PT worked with pt and recommending [place]. SW met with pt, pt would prefer going to [address] [place] as she feels she is not safe enough to dc home. MJ notified. Pt's dtr can transport her around 4pm to [facility]. Pt will need another covid test, RN notified of above. Awaiting for bed. Gregoria Saunders, CATHY    ADDENDUM:     Pt to dc to rm 35 23 [id number] at [facility], RN to call [phone] for report.  Awaiting for covid test.

## 2022-05-25 NOTE — PLAN OF CARE
Assumed care of pt around 1900. A/O x4. RA. NSR on tele. Refused to take her BP medication overnight. Educated pt on the necessity of this medication. Per pt \"tired of taking pills w/ no change in condition. \" Agreeable to IV BP meds if needed. Closely monitoring BP. Per pt feels constipated. Still passing gas. Tried Dulcolax suppository. Still no BM. Severe pain after straining. Applied rectal cream, which seemed to have some relief. C/O headache. Given Tylenol with no relief. PRN Tramadol seemed to help more. Neuro's q4hr. Unchanged. Pt is up to the bathroom w/ walker and 1 assist.       Problem: NEUROLOGICAL - ADULT  Goal: Achieves stable or improved neurological status  Description: INTERVENTIONS  - Assess for and report changes in neurological status  - Initiate measures to prevent increased intracranial pressure  - Maintain blood pressure and fluid volume within ordered parameters to optimize cerebral perfusion and minimize risk of hemorrhage  - Monitor temperature, glucose, and sodium.  Initiate appropriate interventions as ordered  Outcome: Progressing  Goal: Achieves maximal functionality and self care  Description: INTERVENTIONS  - Monitor swallowing and airway patency with patient fatigue and changes in neurological status  - Encourage and assist patient to increase activity and self care with guidance from PT/OT  - Encourage visually impaired, hearing impaired and aphasic patients to use assistive/communication devices  Outcome: Progressing     Problem: Impaired Functional Mobility  Goal: Achieve highest/safest level of mobility/gait  Description: Interventions:  - Assess patient's functional ability and stability  - Promote increasing activity/tolerance for mobility and gait  - Educate and engage patient/family in tolerated activity level and precautions  Outcome: Progressing     Problem: Impaired Activities of Daily Living  Goal: Achieve highest/safest level of independence in self care  Description: Interventions:  - Assess ability and encourage patient to participate in ADLs to maximize function  - Promote sitting position while performing ADLs such as feeding, grooming, and bathing  - Educate and encourage patient/family in tolerated functional activity level and precautions during self-care  Outcome: Progressing

## 2022-05-25 NOTE — PROGRESS NOTES
Nurse notified about CT head today as patient had dizziness  CT head shows stable to resolving right thalamic hemorrhage and minimal increase in edema    ICH usually takes 2-4 weeks to resolve. Normal evolution and nothing concerning on CT head. Clinically patient is overall stable    She has repeat CT head ordered to be done in 1-2 weeks post discharge. She can see us in the clinic in a month    Call us if needed. Thanks!

## 2022-05-26 NOTE — PLAN OF CARE
Assumed care at 0730  Orientated x4, NSR, RA  Complaints of headache; given tylenol and tramadol PRN   Complaint of rectal pain: rectal cream applied    No neuro deficits   VSS   BP monitored and maintained     Plan for discharge to [facility]

## 2022-05-26 NOTE — PLAN OF CARE
NURSING DISCHARGE NOTE    Discharged Rehab facility via Wheelchair. Accompanied by Support staff  Belongings Taken by patient/family. Family and patient educated on medications and side effects, follow up appointments; daughter face sheet, transport report, and PCS form to give to [facility] as well as three medication prescriptions. No further questions from family or friends.  Report given to [facility] at [facility].

## 2022-09-09 ENCOUNTER — TELEPHONE (OUTPATIENT)
Dept: NEUROLOGY | Facility: CLINIC | Age: 67
End: 2022-09-09

## 2022-09-09 NOTE — TELEPHONE ENCOUNTER
rec'd disablility paperwork from [employer], dated on 9/9/22.  requesting dos for 5/20/22 to present; paperwork placed in nurses folder for completion

## 2022-11-14 ENCOUNTER — HOSPITAL ENCOUNTER (OUTPATIENT)
Dept: CT IMAGING | Facility: HOSPITAL | Age: 67
Discharge: HOME | End: 2022-11-14
Attending: NURSE PRACTITIONER
Payer: COMMERCIAL

## 2022-11-14 DIAGNOSIS — I61.9 CEREBRAL BRAIN HEMORRHAGE (HCC): ICD-10-CM

## 2022-11-14 PROCEDURE — 70450 CT HEAD/BRAIN W/O DYE: CPT | Performed by: NURSE PRACTITIONER

## 2022-11-14 RX ORDER — BUSPIRONE HYDROCHLORIDE 5 MG/1
5 TABLET ORAL 2 TIMES DAILY
COMMUNITY

## 2022-11-16 ENCOUNTER — ANESTHESIA (OUTPATIENT)
Dept: ENDOSCOPY | Facility: HOSPITAL | Age: 67
End: 2022-11-16
Payer: COMMERCIAL

## 2022-11-16 ENCOUNTER — ANESTHESIA EVENT (OUTPATIENT)
Dept: ENDOSCOPY | Facility: HOSPITAL | Age: 67
End: 2022-11-16
Payer: COMMERCIAL

## 2022-11-16 ENCOUNTER — HOSPITAL ENCOUNTER (OUTPATIENT)
Facility: HOSPITAL | Age: 67
Setting detail: HOSPITAL OUTPATIENT SURGERY
Discharge: HOME | End: 2022-11-16
Attending: INTERNAL MEDICINE | Admitting: INTERNAL MEDICINE
Payer: COMMERCIAL

## 2022-11-16 VITALS
SYSTOLIC BLOOD PRESSURE: 177 MMHG | WEIGHT: 104 LBS | TEMPERATURE: 98 F | HEIGHT: 63 IN | HEART RATE: 55 BPM | RESPIRATION RATE: 16 BRPM | BODY MASS INDEX: 18.43 KG/M2 | OXYGEN SATURATION: 99 % | DIASTOLIC BLOOD PRESSURE: 88 MMHG

## 2022-11-16 DIAGNOSIS — Z12.11 COLON CANCER SCREENING: ICD-10-CM

## 2022-11-16 DIAGNOSIS — K62.89 RECTAL PAIN: ICD-10-CM

## 2022-11-16 DIAGNOSIS — I61.0 THALAMIC HEMORRHAGE (HCC): ICD-10-CM

## 2022-11-16 DIAGNOSIS — Z80.0 FAMILY HISTORY OF COLON CANCER: ICD-10-CM

## 2022-11-16 PROCEDURE — 88305 TISSUE EXAM BY PATHOLOGIST: CPT | Performed by: INTERNAL MEDICINE

## 2022-11-16 PROCEDURE — 0DBP8ZX EXCISION OF RECTUM, VIA NATURAL OR ARTIFICIAL OPENING ENDOSCOPIC, DIAGNOSTIC: ICD-10-PCS | Performed by: INTERNAL MEDICINE

## 2022-11-16 RX ORDER — SODIUM CHLORIDE, SODIUM LACTATE, POTASSIUM CHLORIDE, CALCIUM CHLORIDE 600; 310; 30; 20 MG/100ML; MG/100ML; MG/100ML; MG/100ML
INJECTION, SOLUTION INTRAVENOUS CONTINUOUS
Status: DISCONTINUED | OUTPATIENT
Start: 2022-11-16 | End: 2022-11-16

## 2022-11-16 RX ORDER — HYDROMORPHONE HYDROCHLORIDE 1 MG/ML
0.2 INJECTION, SOLUTION INTRAMUSCULAR; INTRAVENOUS; SUBCUTANEOUS EVERY 5 MIN PRN
Status: DISCONTINUED | OUTPATIENT
Start: 2022-11-16 | End: 2022-11-16

## 2022-11-16 RX ORDER — NALOXONE HYDROCHLORIDE 0.4 MG/ML
80 INJECTION, SOLUTION INTRAMUSCULAR; INTRAVENOUS; SUBCUTANEOUS AS NEEDED
Status: DISCONTINUED | OUTPATIENT
Start: 2022-11-16 | End: 2022-11-16

## 2022-11-16 RX ORDER — MIDAZOLAM HYDROCHLORIDE 1 MG/ML
INJECTION INTRAMUSCULAR; INTRAVENOUS AS NEEDED
Status: DISCONTINUED | OUTPATIENT
Start: 2022-11-16 | End: 2022-11-16 | Stop reason: SURG

## 2022-11-16 RX ORDER — HYDROMORPHONE HYDROCHLORIDE 1 MG/ML
0.4 INJECTION, SOLUTION INTRAMUSCULAR; INTRAVENOUS; SUBCUTANEOUS EVERY 5 MIN PRN
Status: DISCONTINUED | OUTPATIENT
Start: 2022-11-16 | End: 2022-11-16

## 2022-11-16 RX ORDER — PHENYLEPHRINE HCL 10 MG/ML
VIAL (ML) INJECTION AS NEEDED
Status: DISCONTINUED | OUTPATIENT
Start: 2022-11-16 | End: 2022-11-16 | Stop reason: SURG

## 2022-11-16 RX ORDER — LIDOCAINE HYDROCHLORIDE 10 MG/ML
INJECTION, SOLUTION EPIDURAL; INFILTRATION; INTRACAUDAL; PERINEURAL AS NEEDED
Status: DISCONTINUED | OUTPATIENT
Start: 2022-11-16 | End: 2022-11-16 | Stop reason: SURG

## 2022-11-16 RX ORDER — HYDROMORPHONE HYDROCHLORIDE 1 MG/ML
0.6 INJECTION, SOLUTION INTRAMUSCULAR; INTRAVENOUS; SUBCUTANEOUS EVERY 5 MIN PRN
Status: DISCONTINUED | OUTPATIENT
Start: 2022-11-16 | End: 2022-11-16

## 2022-11-16 RX ADMIN — SODIUM CHLORIDE, SODIUM LACTATE, POTASSIUM CHLORIDE, CALCIUM CHLORIDE: 600; 310; 30; 20 INJECTION, SOLUTION INTRAVENOUS at 16:20:00

## 2022-11-16 RX ADMIN — PHENYLEPHRINE HCL 100 MCG: 10 MG/ML VIAL (ML) INJECTION at 16:46:00

## 2022-11-16 RX ADMIN — SODIUM CHLORIDE, SODIUM LACTATE, POTASSIUM CHLORIDE, CALCIUM CHLORIDE: 600; 310; 30; 20 INJECTION, SOLUTION INTRAVENOUS at 17:02:00

## 2022-11-16 RX ADMIN — MIDAZOLAM HYDROCHLORIDE 2 MG: 1 INJECTION INTRAMUSCULAR; INTRAVENOUS at 16:19:00

## 2022-11-16 RX ADMIN — LIDOCAINE HYDROCHLORIDE 25 MG: 10 INJECTION, SOLUTION EPIDURAL; INFILTRATION; INTRACAUDAL; PERINEURAL at 16:21:00

## 2022-11-16 NOTE — DISCHARGE INSTRUCTIONS
Home Care Instructions for Colonoscopy  With Sedation    Diet:  - Resume your regular diet as tolerated unless otherwise instructed. - start with light meals to minimize bloating.  - Do not drink alcohol today. Medication:  - If you have questions about resuming your normal medications, please contact your Primary Care Physician. Activities:  - Take it easy today. Do not return to work today. - Do not drive today. - Do not operate any machinery today (including kitchen equipment). Colonoscopy:  - You may notice some rectal \"spotting\" (a little blood on the toilet tissue) for a day or two after the exam. This is normal.  - If you experience any rectal bleeding (not spotting), persistent tenderness or sharp severe abdominal pains, oral temperature over 100 degrees Farenheit, light-headedness or dizziness, or any other problems, contact your doctor. **If unable to reach your doctor, please go to the [facility] Emergency Room**    - Your referring physician will receive a full report of your examination.  - If you do not hear from your doctor's office within two weeks of your biopsy, please call them for your results.     Additional Comments/Instructions (if applicable):

## 2022-11-16 NOTE — OPERATIVE REPORT
Operative Report-Colonoscopy with snare polypectomy    Ana María [facility] 8/7/1955   [facility] [id number] MRN [id number]   Admission Date 11/16/2022 Operation Date 11/16/2022   Attending Physician Janice Mauricio DO Operating Physician Marjorie Euceda DO     PREOPERATIVE DIAGNOSIS/INDICATION: Rectal pain, family history of colon cancer  POSTOPERTATIVE DIAGNOSIS: Melanosis Coli, Rectal polyps, Hemorrhoids  PROCEDURE PERFORMED: COLONOSCOPY  INFORMED CONSENT:  Once a brief history and physical examination was performed, the risks, benefits and alternatives to the procedure were discussed with the patient and/or family and informed consent was obtained. The risks of sedation, perforation, missed lesions and need for surgery were all discussed. Patient expressed understanding of the risks and agreed to proceed. PROCEDURE DESCRIPTION:The patient was then brought to the endoscopy suite where her pulse, pulse oximetry and blood pressure were monitored. She was placed in the left lateral decubitus position and deep sedation was administered. Once adequate sedation was achieved, a rectal exam was performed which was normal. A lubricated tip of an Olympus video colonoscope was then inserted through the rectum and gently manipulated under direct visualization to the cecal pole and the terminal ileum. The quality of the preparation was good. Upon withdrawal of the colonoscope, careful visualization of the mucosa was performed. Hollywood Prep Score: Right Colon 3 Transverse colon 3 Left colon 3  FINDINGS/THERAPEUTICS:  - TERMINAL ILEUM: Normal.   - COLON: There was severe melanosis coli throughout the colon. There were two 4 mm, sessile polyps in the rectum which were removed with the cold snare.   - RECTUM: Grade I Internal hemorrhoids, swollen external hemorrhoids.   RECOMMENDATIONS:   - Post Colonoscopy/polypectomy precautions, watch for bleeding, infection, perforation, adverse drug reactions   - Follow biopsies.  - Recommend Tucks pads, Witch hazel and Desitin cream   - Repeat colonoscopy in 5 years.    - Avoid Senna laxatives  CC Report:   Arlene Hansen DO  11/16/2022  5:02 PM

## 2023-03-03 ENCOUNTER — TELEPHONE (OUTPATIENT)
Dept: SURGERY | Facility: CLINIC | Age: 68
End: 2023-03-03

## 2023-03-03 NOTE — TELEPHONE ENCOUNTER
LTD disability form received from [address] [place] [address].    Per Epic review, patient has not been seen in office, one consult from [address] and Dr. Heather Horn reviewed CT imaging on 5/25/2022. This note has been faxed to [address] [place] [address].    RN called Kirsten Castellano Alexandria from [address] [place] [address] and dicussed above. Per Rupinder, nothing further required from this office and it will be noted that patient has not followed up with this office. Paperwork shredded.

## 2023-03-03 NOTE — TELEPHONE ENCOUNTER
Rec'd [employer] Restrictions form, claim number [id number]. Endorsed to RN for provider review and signature. Placed in folder for p/u.

## 2024-12-17 ENCOUNTER — HOSPITAL ENCOUNTER (EMERGENCY)
Facility: HOSPITAL | Age: 69
Discharge: HOME | End: 2024-12-17
Attending: EMERGENCY MEDICINE
Payer: MEDICARE

## 2024-12-17 VITALS
WEIGHT: 108 LBS | DIASTOLIC BLOOD PRESSURE: 92 MMHG | BODY MASS INDEX: 19 KG/M2 | HEART RATE: 81 BPM | RESPIRATION RATE: 18 BRPM | TEMPERATURE: 98 F | SYSTOLIC BLOOD PRESSURE: 145 MMHG | OXYGEN SATURATION: 97 %

## 2024-12-17 DIAGNOSIS — I47.10 SVT (SUPRAVENTRICULAR TACHYCARDIA) (HCC): Primary | ICD-10-CM

## 2024-12-17 LAB
ALBUMIN SERPL-MCNC: 4.7 G/DL (ref 3.2–4.8)
ALBUMIN/GLOB SERPL: 1.4 {RATIO} (ref 1–2)
ALP LIVER SERPL-CCNC: 128 U/L
ALT SERPL-CCNC: 11 U/L
ANION GAP SERPL CALC-SCNC: 9 MMOL/L (ref 0–18)
AST SERPL-CCNC: 18 U/L (ref ?–34)
ATRIAL RATE: 87 BPM
BASOPHILS # BLD AUTO: 0.01 X10(3) UL (ref 0–0.2)
BASOPHILS NFR BLD AUTO: 0.1 %
BILIRUB SERPL-MCNC: 0.6 MG/DL (ref 0.2–1.1)
BUN BLD-MCNC: 18 MG/DL (ref 9–23)
CALCIUM BLD-MCNC: 11.1 MG/DL (ref 8.7–10.4)
CHLORIDE SERPL-SCNC: 105 MMOL/L (ref 98–112)
CO2 SERPL-SCNC: 25 MMOL/L (ref 21–32)
CREAT BLD-MCNC: 0.88 MG/DL
EGFRCR SERPLBLD CKD-EPI 2021: 70 ML/MIN/1.73M2 (ref 60–?)
EOSINOPHIL # BLD AUTO: 0.08 X10(3) UL (ref 0–0.7)
EOSINOPHIL NFR BLD AUTO: 1.2 %
ERYTHROCYTE [DISTWIDTH] IN BLOOD BY AUTOMATED COUNT: 12.4 %
GLOBULIN PLAS-MCNC: 3.3 G/DL (ref 2–3.5)
GLUCOSE BLD-MCNC: 125 MG/DL (ref 70–99)
HCT VFR BLD AUTO: 48.2 %
HGB BLD-MCNC: 15.9 G/DL
IMM GRANULOCYTES # BLD AUTO: 0.03 X10(3) UL (ref 0–1)
IMM GRANULOCYTES NFR BLD: 0.4 %
LYMPHOCYTES # BLD AUTO: 2.15 X10(3) UL (ref 1–4)
LYMPHOCYTES NFR BLD AUTO: 31.3 %
MAGNESIUM SERPL-MCNC: 2.3 MG/DL (ref 1.6–2.6)
MCH RBC QN AUTO: 28.2 PG (ref 26–34)
MCHC RBC AUTO-ENTMCNC: 33 G/DL (ref 31–37)
MCV RBC AUTO: 85.6 FL
MONOCYTES # BLD AUTO: 0.55 X10(3) UL (ref 0.1–1)
MONOCYTES NFR BLD AUTO: 8 %
NEUTROPHILS # BLD AUTO: 4.05 X10 (3) UL (ref 1.5–7.7)
NEUTROPHILS # BLD AUTO: 4.05 X10(3) UL (ref 1.5–7.7)
NEUTROPHILS NFR BLD AUTO: 59 %
OSMOLALITY SERPL CALC.SUM OF ELEC: 291 MOSM/KG (ref 275–295)
P AXIS: 54 DEGREES
P-R INTERVAL: 190 MS
PLATELET # BLD AUTO: 248 10(3)UL (ref 150–450)
POTASSIUM SERPL-SCNC: 4.1 MMOL/L (ref 3.5–5.1)
PROT SERPL-MCNC: 8 G/DL (ref 5.7–8.2)
Q-T INTERVAL: 276 MS
Q-T INTERVAL: 364 MS
QRS DURATION: 84 MS
QRS DURATION: 84 MS
QTC CALCULATION (BEZET): 438 MS
QTC CALCULATION (BEZET): 458 MS
R AXIS: 40 DEGREES
R AXIS: 78 DEGREES
RBC # BLD AUTO: 5.63 X10(6)UL
SODIUM SERPL-SCNC: 139 MMOL/L (ref 136–145)
T AXIS: -63 DEGREES
T AXIS: 31 DEGREES
VENTRICULAR RATE: 166 BPM
VENTRICULAR RATE: 87 BPM
WBC # BLD AUTO: 6.9 X10(3) UL (ref 4–11)

## 2024-12-17 PROCEDURE — 96375 TX/PRO/DX INJ NEW DRUG ADDON: CPT

## 2024-12-17 PROCEDURE — 93010 ELECTROCARDIOGRAM REPORT: CPT

## 2024-12-17 PROCEDURE — 83735 ASSAY OF MAGNESIUM: CPT | Performed by: EMERGENCY MEDICINE

## 2024-12-17 PROCEDURE — 85025 COMPLETE CBC W/AUTO DIFF WBC: CPT | Performed by: EMERGENCY MEDICINE

## 2024-12-17 PROCEDURE — 99291 CRITICAL CARE FIRST HOUR: CPT

## 2024-12-17 PROCEDURE — 99284 EMERGENCY DEPT VISIT MOD MDM: CPT

## 2024-12-17 PROCEDURE — 80053 COMPREHEN METABOLIC PANEL: CPT | Performed by: EMERGENCY MEDICINE

## 2024-12-17 PROCEDURE — 93005 ELECTROCARDIOGRAM TRACING: CPT

## 2024-12-17 PROCEDURE — 96374 THER/PROPH/DIAG INJ IV PUSH: CPT

## 2024-12-17 RX ORDER — ADENOSINE 3 MG/ML
INJECTION, SOLUTION INTRAVENOUS
Status: COMPLETED
Start: 2024-12-17 | End: 2024-12-17

## 2024-12-17 RX ORDER — LORAZEPAM 2 MG/ML
0.5 INJECTION INTRAMUSCULAR ONCE
Status: COMPLETED | OUTPATIENT
Start: 2024-12-17 | End: 2024-12-17

## 2024-12-17 RX ORDER — LORAZEPAM 2 MG/ML
INJECTION INTRAMUSCULAR
Status: DISCONTINUED
Start: 2024-12-17 | End: 2024-12-17

## 2024-12-17 RX ORDER — LORAZEPAM 2 MG/ML
0.5 INJECTION INTRAMUSCULAR ONCE
Status: DISCONTINUED | OUTPATIENT
Start: 2024-12-17 | End: 2024-12-17

## 2024-12-17 RX ORDER — AMLODIPINE BESYLATE 2.5 MG/1
2.5 TABLET ORAL DAILY
COMMUNITY

## 2024-12-17 RX ORDER — ADENOSINE 3 MG/ML
6 INJECTION, SOLUTION INTRAVENOUS ONCE
Status: COMPLETED | OUTPATIENT
Start: 2024-12-17 | End: 2024-12-17

## 2024-12-17 NOTE — DISCHARGE INSTRUCTIONS
Continue your current medications.    Return to the emergency department for any new or worsening symptoms.

## 2024-12-17 NOTE — ED PROVIDER NOTES
Patient Seen in: [facility] Emergency Department      History     Chief Complaint   Patient presents with    Arrythmia/Palpitations     Stated Complaint: palpitations    Subjective:   HPI      69-year-old female with a history of SVT presents to the emergency department complaining of rapid heartbeat and discomfort since 7 AM this morning.  No loss of consciousness.  Has had prior episodes of the same.  Is followed by a non-system cardiologist.    Objective:     Past Medical History:    Anesthesia complication    takes long time to recover    Anxiety state    Arrhythmia    Back problem    CANCER    breast ductal-radiation tamoxifen    Disorder of thyroid    FX DISTAL RADIUS NEC-CLOSE    High blood pressure    History of COVID-19    not hospitalized; no continued symptoms; body aches    HYPERTENSION    HYPERTENSION NOS    HYPOTHYROIDISM    MALIGN NEOPL BREAST NOS    2014 Breast CA also    Migraines    Muscle weakness    OSTEOPOROSIS    OSTEOPOROSIS NOS    Stroke (HCC)    dizziness; left side weakness    Unspecified hypothyroidism    Visual impairment    glasses    VITAMIN D DEFICIENCY NOS              Past Surgical History:   Procedure Laterality Date    Colonoscopy  2006    Colonoscopy N/A 11/16/2022    Procedure: COLONOSCOPY with cold snare polypectomy;  Surgeon: Ivanna Fernandez DO;  Location:  ENDOSCOPY    D & c      Lumpectomy left Left 10/2/14 ([facility])    DR.AMRIT SOLER    Lumpectomy right  6/06    [facility]    Other surgical history  2006    Lumpectomy and LND right breast    Radiation right                  Social History     Socioeconomic History    Marital status:    Occupational History    Occupation: Fiancial consultant   Tobacco Use    Smoking status: Never    Smokeless tobacco: Never   Vaping Use    Vaping status: Never Used   Substance and Sexual Activity    Alcohol use: Not Currently     Comment: none since stroke in 5/2022    Drug use: No    Sexual activity: Yes     Partners: Male   Other  Topics Concern    Exercise Yes     Comment: weights, walking     Social Drivers of Health     Food Insecurity: Low Risk  (5/10/2024)    Received from [facility]    Food Insecurity     Have there been times that your food ran out, and you didn't have money to get more?: No     Are there times that you worry that this might happen?: No   Transportation Needs: Low Risk  (5/10/2024)    Received from [facility]    Transportation Needs     Do you have trouble getting transportation to medical appointments?: No   Physical Activity: Sufficiently Active (3/11/2020)    Received from [facility]    Exercise Vital Sign     Days of Exercise per Week: 5 days     Minutes of Exercise per Session: 60 min   Housing Stability: Low Risk  (5/10/2024)    Received from [facility]    Housing Stability     Are you worried that your electric, gas, oil, or water might be shut off?: No     Are you concerned about having a safe and reliable place to live?: No                  Physical Exam     ED Triage Vitals   BP 12/17/24 0930 (!) 163/123   Pulse 12/17/24 0928 (!) 167   Resp 12/17/24 0928 22   Temp 12/17/24 0939 97.9 °F (36.6 °C)   Temp src 12/17/24 0939 Oral   SpO2 12/17/24 0928 99 %   O2 Device 12/17/24 0928 None (Room air)       Current Vitals:   Vital Signs  BP: (!) 145/92  Pulse: 81  Resp: 18  Temp: 97.9 °F (36.6 °C)  Temp src: Oral  MAP (mmHg): (!) 117    Oxygen Therapy  SpO2: 97 %  O2 Device: None (Room air)        Physical Exam     General Appearance: This is an older female lying on a gurney.  Vital signs were reviewed per nurses notes.  Monitor reveals a narrow complex tachycardia rate of 160.  Initial blood pressure was 150/90.  Pulse oximetry is 97% on room air.  HEENT: Normocephalic/atraumatic.  Anicteric sclera.  Oral mucosa is moist.  Oropharynx is normal.  Neck: No adenopathy or thyromegaly.  Lungs are clear to auscultation.  Heart exam: Normal S1-S2  without extra sounds or murmurs.  Rapid rate, regular rhythm.  Abdomen is nontender.  Extremities are atraumatic.  Skin is dry without rashes or lesions.  Neuroexam: Awake, conversive and moving all 4 extremities well.  ED Course     Labs Reviewed   COMP METABOLIC PANEL (14) - Abnormal; Notable for the following components:       Result Value    Glucose 125 (*)     Calcium, Total 11.1 (*)     All other components within normal limits   CBC WITH DIFFERENTIAL WITH PLATELET - Abnormal; Notable for the following components:    RBC 5.63 (*)     HCT 48.2 (*)     All other components within normal limits   MAGNESIUM - Normal   RAINBOW DRAW LAVENDER   RAINBOW DRAW LIGHT GREEN   RAINBOW DRAW BLUE     EKG    Rate, intervals and axes as noted on EKG Report.  Rate: 166  Rhythm: Supraventricular tachycardia  Reading: Marked ST segment abnormality.  Abnormal EKG.  Agree with EKG report.                Intravenous access was secured by nursing staff.  Laboratory studies were drawn.  Patient was given adenosine 6 mg IV push with subsequent conversion to a sinus tachycardia.    EKG    Rate, intervals and axes as noted on EKG Report.  Rate: 87  Rhythm: Sinus Rhythm  Reading: Normal EKG.  Agree with EKG report.      Patient remained in a sinus rhythm throughout the remainder the emergency department stay.  Test results and treatment plan were discussed prior to disposition.    A total of 35 minutes of critical care time (exclusive of billable procedures) was administered to manage the patient's unstable vital signs due to her SVT.  This involved direct patient intervention, complex decision making, and/or extensive discussions with the patient, family, and clinical staff.        MDM      #1.  Supraventricular tachycardia.  Converted with adenosine.  Patient expressed discontent regarding her current care and so she was provided referral for [facility] here at [facility].  Advised to continue current  medications.        MDM    Disposition and Plan     Clinical Impression:  1. SVT (supraventricular tachycardia) (HCC)         Disposition:  Discharge  12/17/2024 10:34 am    Follow-up:  Yadira Chavez  [facility] [address] [phone]    Call in 1 day(s)      [address] [address] [phone]  Call  As needed.  [facility] if you desire a second opinion.          Medications Prescribed:  Discharge Medication List as of 12/17/2024 10:40 AM              Supplementary Documentation:

## 2025-06-16 PROBLEM — R79.89 ELEVATED TROPONIN: Status: ACTIVE | Noted: 2023-01-03

## 2025-06-16 PROBLEM — F41.9 ANXIETY: Status: ACTIVE | Noted: 2022-09-24

## 2025-06-16 PROBLEM — F32.2 CURRENT SEVERE EPISODE OF MAJOR DEPRESSIVE DISORDER WITHOUT PSYCHOTIC FEATURES (HCC): Status: ACTIVE | Noted: 2024-05-13

## 2025-06-16 PROBLEM — F51.01 PRIMARY INSOMNIA: Status: ACTIVE | Noted: 2023-01-09

## 2025-06-16 PROBLEM — F43.21 GRIEF: Status: ACTIVE | Noted: 2023-01-09

## 2025-06-16 PROBLEM — I69.359 HISTORY OF HEMORRHAGIC STROKE WITH RESIDUAL HEMIPARESIS (HCC): Status: ACTIVE | Noted: 2024-05-13

## 2025-06-16 PROBLEM — R26.81 UNSTEADINESS: Status: ACTIVE | Noted: 2023-08-24

## 2025-06-16 PROBLEM — I25.2 HISTORY OF NON-ST ELEVATION MYOCARDIAL INFARCTION (NSTEMI): Status: ACTIVE | Noted: 2023-01-03

## 2025-06-16 PROBLEM — I47.10 SVT (SUPRAVENTRICULAR TACHYCARDIA) (HCC): Status: ACTIVE | Noted: 2024-03-04

## 2025-06-16 PROBLEM — F43.23 ADJUSTMENT DISORDER WITH MIXED ANXIETY AND DEPRESSED MOOD: Status: ACTIVE | Noted: 2022-05-26

## 2025-07-15 ENCOUNTER — HOSPITAL ENCOUNTER (OUTPATIENT)
Facility: HOSPITAL | Age: 70
Setting detail: HOSPITAL OUTPATIENT SURGERY
Discharge: HOME | End: 2025-07-15
Attending: INTERNAL MEDICINE | Admitting: INTERNAL MEDICINE
Payer: MEDICARE

## 2025-07-15 ENCOUNTER — ANESTHESIA (OUTPATIENT)
Dept: ENDOSCOPY | Facility: HOSPITAL | Age: 70
End: 2025-07-15
Payer: MEDICARE

## 2025-07-15 ENCOUNTER — ANESTHESIA EVENT (OUTPATIENT)
Dept: ENDOSCOPY | Facility: HOSPITAL | Age: 70
End: 2025-07-15
Payer: MEDICARE

## 2025-07-15 VITALS
HEART RATE: 58 BPM | OXYGEN SATURATION: 98 % | SYSTOLIC BLOOD PRESSURE: 163 MMHG | DIASTOLIC BLOOD PRESSURE: 92 MMHG | RESPIRATION RATE: 16 BRPM | WEIGHT: 107 LBS | BODY MASS INDEX: 18.72 KG/M2 | HEIGHT: 63.5 IN | TEMPERATURE: 98 F

## 2025-07-15 DIAGNOSIS — K59.09 CHRONIC CONSTIPATION: ICD-10-CM

## 2025-07-15 DIAGNOSIS — K83.8 DILATED BILE DUCT: ICD-10-CM

## 2025-07-15 DIAGNOSIS — Z80.0 FAMILY HISTORY OF COLON CANCER: ICD-10-CM

## 2025-07-15 DIAGNOSIS — K62.89 RECTAL PAIN: ICD-10-CM

## 2025-07-15 PROCEDURE — 88305 TISSUE EXAM BY PATHOLOGIST: CPT | Performed by: INTERNAL MEDICINE

## 2025-07-15 RX ORDER — NALOXONE HYDROCHLORIDE 0.4 MG/ML
0.08 INJECTION, SOLUTION INTRAMUSCULAR; INTRAVENOUS; SUBCUTANEOUS ONCE AS NEEDED
Status: DISCONTINUED | OUTPATIENT
Start: 2025-07-15 | End: 2025-07-15

## 2025-07-15 RX ORDER — SODIUM CHLORIDE, SODIUM LACTATE, POTASSIUM CHLORIDE, CALCIUM CHLORIDE 600; 310; 30; 20 MG/100ML; MG/100ML; MG/100ML; MG/100ML
INJECTION, SOLUTION INTRAVENOUS CONTINUOUS
Status: DISCONTINUED | OUTPATIENT
Start: 2025-07-15 | End: 2025-07-15

## 2025-07-15 RX ADMIN — SODIUM CHLORIDE, SODIUM LACTATE, POTASSIUM CHLORIDE, CALCIUM CHLORIDE: 600; 310; 30; 20 INJECTION, SOLUTION INTRAVENOUS at 16:26:00

## 2025-07-15 RX ADMIN — SODIUM CHLORIDE, SODIUM LACTATE, POTASSIUM CHLORIDE, CALCIUM CHLORIDE: 600; 310; 30; 20 INJECTION, SOLUTION INTRAVENOUS at 16:16:00

## 2025-07-15 NOTE — OPERATIVE REPORT
Sarita Thompson Patient Status:  Hospital Outpatient Surgery    1955 MRN [id number]   [facility] Attending Justin Sauceda MD   Date 7/15/2025 PCP Yadira Chavez     PREOPERATIVE DIAGNOSIS/INDICATION: Rectal pain  POSTOPERTATIVE DIAGNOSIS: melanosis, rectal polyps, hemorrhoids  PROCEDURE PERFORMED: Flexible sigmoidoscopy with cold snare polypectomy  SEDATION: MAC sedation provided by General Anesthesia    TIME OUT WAS PERFORMED    INFORMED CONSENT: Risks, benefits and alternatives to the procedure were explained to the patient including but not limited to bleeding, infection, perforation, adverse drug reactions, pancreatitis and the need for hospitalization and surgery if this occurs, the patient understands and agrees to procedure.  PROCEDURE DESCRIPTION: After careful digital rectal examination a scope was introduced into the patients rectum, advanced pass the recto sigmoid junction, into the sigmoid colon. Careful examination of the above described areas was performed on withdrawal of the endoscope. Retroflexion was performed on the rectum. The patient tolerated the procedure well, there were no immediate complication immediately following the procedure, and the patient was transferred to recovery in stable condition.  QUALITY OF PREPARATION: [place] Bowel Preparation Scale:            -     Left colon 3   FINDINGS/THERAPEUTICS:  SIGMOID COLON: Melanosis coli  RECTUM: Melanosis coli, two 2 mm flat polyps s/p cold snare polypectomy, grade 1-2 Internal hemorrhoids  RECOMMENDATIONS:   Post sigmoidoscopy/polypectomy precautions, watch for bleeding, infection, perforation, adverse drug reactions   Follow biopsies.    Justin Sauceda MD  7/15/2025  4:26 PM

## 2025-07-15 NOTE — ANESTHESIA PREPROCEDURE EVALUATION
PRE-OP EVALUATION    Patient Name: Sarita Thompson    Admit Diagnosis: Rectal pain [K62.89]  Family history of colon cancer [Z80.0]  Chronic constipation [K59.09]  Dilated bile duct [K83.8]    Pre-op Diagnosis: Rectal pain [K62.89]  Family history of colon cancer [Z80.0]  Chronic constipation [K59.09]  Dilated bile duct [K83.8]    FLEXIBLE SIGMOIDOSCOPY    Anesthesia Procedure: FLEXIBLE SIGMOIDOSCOPY    Surgeons and Role:     * Justin Sauceda MD - Primary    Pre-op vitals reviewed.  Temp: 98.1 °F (36.7 °C)  Pulse: 59  Resp: 16  BP: 163/92  SpO2: 97 %  Body mass index is 18.66 kg/m².    Current medications reviewed.  Hospital Medications:  Current Medications[1]    Outpatient Medications:   Prescriptions Prior to Admission[2]    Allergies: Erythromycin base, Hydrocodone, and Duraprep      Anesthesia Evaluation    Patient summary reviewed.    Anesthetic Complications  (+) history of anesthetic complications  History of: PONV       GI/Hepatic/Renal                                 Cardiovascular        Exercise tolerance: good     MET: >4      (+) hypertension       (+) past MI           (+) dysrhythmias and SVT                  Endo/Other           (+) hypothyroidism                       Pulmonary                           Neuro/Psych      (+) depression  (+) anxiety  (+) CVA         (+) headaches               Past Surgical History[3]  Social Hx on file[4]  History   Drug Use No     Available pre-op labs reviewed.               Airway      Mallampati: II  Mouth opening: >3 FB  TM distance: > 6 cm   Cardiovascular    Cardiovascular exam normal.         Dental             Pulmonary    Pulmonary exam normal.                 Other findings        ASA: 3   Plan: MAC  NPO status verified and patient meets guidelines.          Plan/risks discussed with: patient            Present on Admission:  **None**               [1]  • lactated ringers infusion   Intravenous Continuous   [2]  Medications Prior to Admission    Medication Sig Dispense Refill Last Dose/Taking   • estradiol 0.1 MG/GM Vaginal Cream Place 2 g vaginally twice a week.   Past Month   • gabapentin 100 MG Oral Cap Take 1 capsule (100 mg total) by mouth daily.   Past Week   • LORazepam 0.5 MG Oral Tab Take 1 tablet (0.5 mg total) by mouth daily as needed.   7/14/2025   • mirtazapine 15 MG Oral Tab Take 1 tablet (15 mg total) by mouth nightly.   7/13/2025   • Naproxen Sodium 550 MG Oral Tab Take 1 tablet (550 mg total) by mouth 2 (two) times daily as needed.   Past Week   • losartan 50 MG Oral Tab Take 1 tablet (50 mg total) by mouth 2 (two) times daily.   7/13/2025   • amLODIPine 2.5 MG Oral Tab Take 1 tablet (2.5 mg total) by mouth in the morning.   7/15/2025   • carvedilol 12.5 MG Oral Tab Take 1 tablet (12.5 mg total) by mouth 2 (two) times daily with meals. 60 tablet 0 7/15/2025   • levothyroxine 112 MCG Oral Tab Take 100 mcg by mouth in the morning.   7/14/2025   • Cholecalciferol (VITAMIN D) 2000 UNITS Oral Cap Take 2 capsules (4,000 Units total) by mouth in the morning.   7/14/2025   • M-VIT OR TABS Take 1 tablet by mouth in the morning.   7/14/2025   [3]  Past Surgical History:  Procedure Laterality Date   • Colonoscopy  2006   • Colonoscopy N/A 11/16/2022    Procedure: COLONOSCOPY with cold snare polypectomy;  Surgeon: Ivanna Fernandez DO;  Location:  ENDOSCOPY   • D & c     • Lumpectomy left Left 10/2/14 ([facility])    DR.AMRIT SOLER   • Lumpectomy right  6/06    [facility]   • Other surgical history  2006    Lumpectomy and LND right breast   • Radiation right     [4]  Social History  Socioeconomic History   • Marital status:    Occupational History   • Occupation: Fiancial consultant   Tobacco Use   • Smoking status: Never   • Smokeless tobacco: Never   Vaping Use   • Vaping status: Never Used   Substance and Sexual Activity   • Alcohol use: Not Currently     Comment: none since stroke in 5/2022   • Drug use: No   • Sexual activity: Yes     Partners: Male    Other Topics Concern   • Exercise Yes     Comment: weights, walking

## 2025-07-15 NOTE — DISCHARGE INSTRUCTIONS
Home Care Instructions for FLEXIBLE SIGMOIDOSCOPY  with Sedation    Diet:  - Resume your regular diet as tolerated unless otherwise instructed.  - Start with light meals to minimize bloating.  - Do not drink alcohol today.    Medication:  - If you have questions about resuming your normal medications, please contact your Primary Care Physician.    Activities:  - Take it easy today. Do not return to work today.  - Do not drive today.  - Do not operate any machinery today (including kitchen equipment).    FLEXIBLE SIGMOIDOSCOPY :  - You may notice some rectal \"spotting\" (a little blood on the toilet tissue) for a day or two after the exam. This is normal.  - If you experience any rectal bleeding (not spotting), persistent tenderness or sharp severe abdominal pains, oral temperature over 100 degrees Fahrenheit, light-headedness or dizziness, or any other problems, contact your doctor.    **If unable to reach your doctor, please go to the [facility] Emergency Room**    - Your referring physician will receive a full report of your examination.  - If you do not hear from your doctor's office within two weeks of your biopsy, please call them for your results.   Last appointment: 12/13/2021  Next appointment: 5/3/2022  Last refill: 10/3/21 and 12/13/21

## 2025-07-15 NOTE — H&P
[facility]  Pre-op H and P    Sarita Thompson Patient Status:  Hospital Outpatient Surgery    1955 MRN [id number]   Location [facility] ENDOSCOPY [facility]IN [facility]ENTER Attending Justin Sauceda MD   Date 7/15/2025 PCP Yadira Chavez     CC:   Rectal pain   Family history of colon cancer       History of Present Illness:  Sarita Thompson is a a(n) 69 year old female.   Rectal pain   Family history of colon cancer       History:  Past Medical History[1]  Past Surgical History[2]  Family History[3]   reports that she has never smoked. She has never used smokeless tobacco. She reports that she does not currently use alcohol. She reports that she does not use drugs.    Allergies:  Allergies[4]    Medications:  Current Hospital Medications[5]    Physical Exam:    Blood pressure (!) 174/97, pulse 77, temperature 98.1 °F (36.7 °C), temperature source Temporal, resp. rate 18, height 5' 3.5\" (1.613 m), weight 107 lb (48.5 kg), last menstrual period 2013, SpO2 96%.    General: Appears alert, oriented x3 and in no acute distress.  CV: Normal rate   Lungs: Normal effort   Skin: Warm and dry.  Laboratory Data:           Assessment/Plan/Procedure:  Problem List[6]    Rectal pain   Family history of colon cancer       Plan:  Flexible sigmoidoscopy    Justin Sauceda MD  7/15/2025  3:35 PM       [1]   Past Medical History:   Abdominal distention    Acute, but ill-defined, cerebrovascular disease    Anesthesia complication    takes long time to recover    Anxiety state    Arrhythmia    Back pain    Sciatica, hip pain from balance issues    Back problem    Bloating    CANCER    breast ductal-radiation tamoxifen    Constipation    Disorder of thyroid    Dizziness    Exposure to medical diagnostic radiation    last tx     Fatigue    Flatulence/gas pain/belching    FX DISTAL RADIUS NEC-CLOSE    Headache disorder    Hemorrhoids    High blood pressure    History of COVID-19    not hospitalized; no continued  symptoms; body aches    Hx of motion sickness    HYPERTENSION    HYPERTENSION NOS    HYPOTHYROIDISM    Irregular bowel habits    MALIGN NEOPL BREAST NOS    2014 Breast CA also    Migraines    Muscle weakness    OSTEOPOROSIS    OSTEOPOROSIS NOS    Pain with bowel movements    Stroke (HCC)    dizziness; left side weakness    Unspecified hypothyroidism    Visual impairment    glasses    VITAMIN D DEFICIENCY NOS    Wears glasses   [2]   Past Surgical History:  Procedure Laterality Date    Colonoscopy      Colonoscopy N/A 2022    Procedure: COLONOSCOPY with cold snare polypectomy;  Surgeon: Ivanna Fernandez DO;  Location:  ENDOSCOPY    D & c      Lumpectomy left Left 10/2/14 ([facility])    DR.AMRIT SOLER    Lumpectomy right      IDC    Other surgical history  2006    Lumpectomy and LND right breast    Radiation right     [3]   Family History  Problem Relation Age of Onset    Heart Disease Mother     Heart Disorder Mother     Hypertension Mother     Lipids Mother     Colon Polyps Mother             Heart Attack Mother     Other (Other) Father         AAA    Breast Cancer Cousin     Breast Cancer Paternal Aunt     Breast Cancer Cousin     Breast Cancer Cousin     Breast Cancer Paternal Aunt     Colon Cancer Son         Colorectal stage3 , age 39   [4]   Allergies  Allergen Reactions    Erythromycin Base ARRHYTHMIA     Heart racing    Hydrocodone NAUSEA AND VOMITING    Duraprep RASH   [5]   Current Facility-Administered Medications:     lactated ringers infusion, , Intravenous, Continuous  [6]   Patient Active Problem List  Diagnosis    Malignant neoplasm of breast (female), unspecified site    Unspecified essential hypertension    Osteoporosis, unspecified    Unspecified vitamin D deficiency    Other closed fractures of distal end of radius (alone)    Unspecified hypothyroidism    Hypertensive emergency    Mouth droop due to facial weakness    Left leg weakness    Thalamic hemorrhage (HCC)    Adjustment  disorder with mixed anxiety and depressed mood    Anxiety    Current severe episode of major depressive disorder without psychotic features (HCC)    Elevated troponin    Grief    History of hemorrhagic stroke with residual hemiparesis (HCC)    History of non-ST elevation myocardial infarction (NSTEMI)    Hypercalcemia    Nontoxic multinodular goiter    Primary insomnia    SVT (supraventricular tachycardia) (HCC)    Unsteadiness

## 2025-07-15 NOTE — ANESTHESIA POSTPROCEDURE EVALUATION
[facility]    Sarita Thompson Patient Status:  Hospital Outpatient Surgery   Age/Gender 69 year old female MRN [id number]   Location [facility] ENDOSCOPY PAIN CENTER Attending Justin Sauceda MD   Hosp Day # 0 PCP Yadira Chavez       Anesthesia Post-op Note    FLEXIBLE SIGMOIDOSCOPY    Procedure Summary       Date: 07/15/25 Room / Location:  ENDOSCOPY 02 / [facility] END[facility]COPY    Anesthesia Start: 1616 Anesthesia Stop: 1635    Procedure: FLEXIBLE SIGMOIDOSCOPY Diagnosis:       Rectal pain      Family history of colon cancer      Chronic constipation      Dilated bile duct      (RECTAL POLYP, MELANOSIS)    Surgeons: Justin Sauceda MD Anesthesiologist: Mark Angeles MD    Anesthesia Type: MAC ASA Status: 3            Anesthesia Type: MAC    Vitals Value Taken Time   /74 07/15/25 16:44   Temp  07/15/25 16:49   Pulse 58 07/15/25 16:49   Resp 18 07/15/25 16:49   SpO2 98 % 07/15/25 16:49   Vitals shown include unfiled device data.        Patient Location: Endoscopy    Anesthesia Type: MAC    Airway Patency: patent    Postop Pain Control: adequate    Mental Status: preanesthetic baseline    Nausea/Vomiting: none    Cardiopulmonary/Hydration status: stable euvolemic    Complications: no apparent anesthesia related complications    Postop vital signs: stable    Dental Exam: Unchanged from Preop    Patient to be discharged home when criteria met.

## (undated) DEVICE — FILTERLINE NASAL ADULT O2/CO2

## (undated) DEVICE — LASSO POLYPECTOMY SNARE: Brand: LASSO

## (undated) DEVICE — ENDOSCOPY PACK - LOWER: Brand: MEDLINE INDUSTRIES, INC.

## (undated) DEVICE — KIT CUSTOM ENDOPROCEDURE STERIS

## (undated) DEVICE — 1200CC GUARDIAN II: Brand: GUARDIAN

## (undated) DEVICE — FLUIDGARD® 160 ANTI-FOG SURGICAL MASK WITH ANTI-GLARE SHIELD: Brand: PRECEPT ®

## (undated) DEVICE — SNARE 9MM 230CM 2.4MM EXACTO

## (undated) DEVICE — KIT VLV 5 PC AIR H2O SUCT BX ENDOGATOR CONN

## (undated) DEVICE — TRAP SPEC REMOVAL ETRAP 15CM

## (undated) DEVICE — V2 SPECIMEN COLLECTION MANIFOLD KIT: Brand: NEPTUNE

## (undated) DEVICE — 3M™ RED DOT™ MONITORING ELECTRODE WITH FOAM TAPE AND STICKY GEL, 50/BAG, 20/CASE, 72/PLT 2570: Brand: RED DOT™

## (undated) DEVICE — Device: Brand: DEFENDO AIR/WATER/SUCTION AND BIOPSY VALVE

## (undated) DEVICE — 10FT COMBINED O2 DELIVERY/CO2 MONITORING. FILTER WITH MICROSTREAM TYPE LUER: Brand: DUAL ADULT NASAL CANNULA

## (undated) NOTE — LETTER
22          Shanice Booker  :  1955      To Whom It May Concern: This patient was seen in the hospital starting on . Work status:  Remain off work for at least 2 weeks; plan for follow up appointment in 1 month from . If this office may be of further assistance, please do not hesitate to contact us.       Sincerely,        JAYLEEN Rios

## (undated) NOTE — IP AVS SNAPSHOT
[address]            (For Outpatient Use Only) Initial Admit Date: 2022   Inpt/Obs Admit Date: Inpt: 22 / Obs: N/A   Discharge Date:    Roman Ugarte:  [de-identified]   MRN: [de-identified]   CSN: [id number]   CEID: [id number]        ENCOUNTER  Patient Class: Inpatient Admitting Provider: Stefany Lacy MD Unit: [address] [place] [address] Service: Cardiac Telemetry Attending Provider: Sienna Martinez MD   Bed: 05-   Visit Type:   Referring Physician: No ref. provider found Billing Flag:    Admit Diagnosis: Cerebral brain hemorrhage [facility]) [I61.9]      PATIENT  Legal Name:   Gila Styles   Legal Sex: Female  Gender ID: Female             Pref Name:    PCP:  Meka Rios MD Home: [phone]   Address:  [address] : 1955 (66 yrs) Mobile: [phone]         City/State/Zip: [address] [facility] Marital:  Language: eHlga siegel: Makenna SSN4: [id number] Church:      Race: White Ethnicity: Non  Or [address] [place] [address] Street   Name Relationship Legal Guardian? Home Phone Work Phone Mobile Phone   1. Bradley Thompson  2.  Danielle Rodriguez  Daughter          [phone]     GUARANTOR  Guarantor: Manuela Bocanegra : 1955 Home Phone: [phone]   Address: [address]  Sex: Female Work Phone: [phone]   City/State/Zip: [place] [address] [address]   Rel. to Patient: Self Guarantor ID: [id number]   [address]   Employer: [employer] Status: FULL TIME     COVERAGE  PRIMARY INSURANCE   Payor: [facility]   Group Number: [id number] Insurance Type: [id number] Name: Corinna Garza : 1955   Subscriber ID: [id number] Pt Rel to Subscriber: Self   SECONDARY INSURANCE   Payor: MEDICARE Plan: MEDICARE PART A ONLY   Group Number:  Insurance Type: INDEMNITY   Subscriber Name: Corinna Garza : 1955   Subscriber ID: [id number] Pt Rel to Subscriber: SELF TERTIARY INSURANCE   Payor:  Plan:    Group Number:  Insurance Type:    Subscriber Name:  Subscriber :    Subscriber ID:  Pt Rel to Subscriber:    Hospital Account Financial Class: [employer]    May 25, 2022